# Patient Record
Sex: MALE | NOT HISPANIC OR LATINO | Employment: OTHER | URBAN - METROPOLITAN AREA
[De-identification: names, ages, dates, MRNs, and addresses within clinical notes are randomized per-mention and may not be internally consistent; named-entity substitution may affect disease eponyms.]

---

## 2024-02-29 ENCOUNTER — TRANSFERRED RECORDS (OUTPATIENT)
Dept: HEALTH INFORMATION MANAGEMENT | Facility: CLINIC | Age: 84
End: 2024-02-29
Payer: MEDICARE

## 2024-02-29 LAB — RETINOPATHY: POSITIVE

## 2024-03-04 ENCOUNTER — TRANSCRIBE ORDERS (OUTPATIENT)
Dept: OTHER | Age: 84
End: 2024-03-04

## 2024-03-04 ENCOUNTER — PATIENT OUTREACH (OUTPATIENT)
Dept: ONCOLOGY | Facility: CLINIC | Age: 84
End: 2024-03-04
Payer: MEDICARE

## 2024-03-04 DIAGNOSIS — C69.32 CHOROIDAL MALIGNANT MELANOMA, LEFT (H): Primary | ICD-10-CM

## 2024-03-04 NOTE — PROGRESS NOTES
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    Payor: MEDICARE / Plan: MEDICARE RAILROAD PART B ONLY / Product Type: Medicare /     March 4, 2024    Called patient to introduced myself and role as nurse navigator with [facility] Hematology/Oncology department and to inform them that we have received the referral for a diagnosis of choroidal melanoma from Dr. Fonseca.    Patient did not answer the phone so a detailed message was left for them including NPS number. Requested callback to speak to a  to set the consult date/time/location. Explained to patient in the message that they will receive a call from our new patient scheduling team (NPS number below, hours are Monday - Friday 8am - 4:30 pm) in the next 1-2 business days to schedule the consultation. Encouraged them to call back with any questions.     March 14, 2024  Message from Dr. Pena requesting that patient be scheduled for:  PET/CT, MRI abdomen, and labs (CBC & CMP) that he would like done prior to patient's consult on 3/26/24 with him. Called patient to discuss. Patient did not answer phone. Detailed message left for patient requesting call back.   Message sent to NPS team to schedule.     Ning France, RN, BSN  [facility] Hematology/Oncology Nurse Navigator  [phone]

## 2024-03-13 DIAGNOSIS — C69.32 CHOROIDAL MALIGNANT MELANOMA, LEFT (H): Primary | ICD-10-CM

## 2024-03-13 NOTE — TELEPHONE ENCOUNTER
RECORDS STATUS - ALL OTHER DIAGNOSIS      RECORDS RECEIVED FROM: [facility] [place], [place]   DATE RECEIVED:    NOTES STATUS DETAILS   OFFICE NOTE from referring provider Epic/[facility] Jenna Fonseca MD: 2/29/24   OFFICE NOTE from Ophthalmologist [facility] - [place] Dr. Ximena Corrigan: 9/14/23   MEDICATION LIST [facility]   LABS     ANYTHING RELATED TO DIAGNOSIS Epic/[facility] 3/1/24   IMAGING (NEED IMAGES & REPORT)     CT SCANS Requested 3/13 9/11/23, 3/20/23: [place]   MRI Requested 3/13 9/11/23, 3/31/23: [place]

## 2024-03-14 DIAGNOSIS — C69.32 CHOROIDAL MALIGNANT MELANOMA, LEFT (H): Primary | ICD-10-CM

## 2024-03-26 ENCOUNTER — PRE VISIT (OUTPATIENT)
Dept: ONCOLOGY | Facility: CLINIC | Age: 84
End: 2024-03-26
Payer: MEDICARE

## 2024-04-04 ENCOUNTER — HOSPITAL ENCOUNTER (OUTPATIENT)
Dept: MRI IMAGING | Facility: CLINIC | Age: 84
Discharge: HOME | End: 2024-04-04
Attending: STUDENT IN AN ORGANIZED HEALTH CARE EDUCATION/TRAINING PROGRAM
Payer: MEDICARE

## 2024-04-04 ENCOUNTER — HOSPITAL ENCOUNTER (OUTPATIENT)
Dept: PET IMAGING | Facility: CLINIC | Age: 84
Discharge: HOME | End: 2024-04-04
Attending: STUDENT IN AN ORGANIZED HEALTH CARE EDUCATION/TRAINING PROGRAM
Payer: MEDICARE

## 2024-04-04 VITALS — BODY MASS INDEX: 28.88 KG/M2 | HEIGHT: 69 IN | WEIGHT: 195 LBS

## 2024-04-04 DIAGNOSIS — C69.32 CHOROIDAL MALIGNANT MELANOMA, LEFT (H): ICD-10-CM

## 2024-04-04 LAB
CREAT BLD-MCNC: 1 MG/DL (ref 0.7–1.3)
EGFRCR SERPLBLD CKD-EPI 2021: >60 ML/MIN/1.73M2
RADIOLOGIST FLAGS: NORMAL
RADIOLOGIST FLAGS: NORMAL

## 2024-04-04 PROCEDURE — 71260 CT THORAX DX C+: CPT

## 2024-04-04 PROCEDURE — A9552 F18 FDG: HCPCS | Performed by: STUDENT IN AN ORGANIZED HEALTH CARE EDUCATION/TRAINING PROGRAM

## 2024-04-04 PROCEDURE — G1010 CDSM STANSON: HCPCS | Performed by: RADIOLOGY

## 2024-04-04 PROCEDURE — 78816 PET IMAGE W/CT FULL BODY: CPT | Mod: MG,PI

## 2024-04-04 PROCEDURE — 71260 CT THORAX DX C+: CPT | Mod: 26 | Performed by: RADIOLOGY

## 2024-04-04 PROCEDURE — 74183 MRI ABD W/O CNTR FLWD CNTR: CPT | Mod: 26 | Performed by: STUDENT IN AN ORGANIZED HEALTH CARE EDUCATION/TRAINING PROGRAM

## 2024-04-04 PROCEDURE — 74177 CT ABD & PELVIS W/CONTRAST: CPT | Mod: 26 | Performed by: RADIOLOGY

## 2024-04-04 PROCEDURE — G1010 CDSM STANSON: HCPCS | Performed by: STUDENT IN AN ORGANIZED HEALTH CARE EDUCATION/TRAINING PROGRAM

## 2024-04-04 PROCEDURE — 255N000002 HC RX 255 OP 636: Performed by: STUDENT IN AN ORGANIZED HEALTH CARE EDUCATION/TRAINING PROGRAM

## 2024-04-04 PROCEDURE — 343N000001 HC RX 343: Performed by: STUDENT IN AN ORGANIZED HEALTH CARE EDUCATION/TRAINING PROGRAM

## 2024-04-04 PROCEDURE — 82565 ASSAY OF CREATININE: CPT | Mod: 91

## 2024-04-04 PROCEDURE — 74183 MRI ABD W/O CNTR FLWD CNTR: CPT | Mod: MG

## 2024-04-04 PROCEDURE — A9581 GADOXETATE DISODIUM INJ: HCPCS | Performed by: STUDENT IN AN ORGANIZED HEALTH CARE EDUCATION/TRAINING PROGRAM

## 2024-04-04 PROCEDURE — 250N000011 HC RX IP 250 OP 636: Performed by: STUDENT IN AN ORGANIZED HEALTH CARE EDUCATION/TRAINING PROGRAM

## 2024-04-04 PROCEDURE — 78813 PET IMAGE FULL BODY: CPT | Mod: 26 | Performed by: RADIOLOGY

## 2024-04-04 RX ORDER — IOPAMIDOL 755 MG/ML
10-135 INJECTION, SOLUTION INTRAVASCULAR ONCE
Status: COMPLETED | OUTPATIENT
Start: 2024-04-04 | End: 2024-04-04

## 2024-04-04 RX ADMIN — GADOXETATE DISODIUM 9 ML: 181.43 INJECTION, SOLUTION INTRAVENOUS at 16:05

## 2024-04-04 RX ADMIN — IOPAMIDOL 112 ML: 755 INJECTION, SOLUTION INTRAVENOUS at 11:50

## 2024-04-04 RX ADMIN — FLUDEOXYGLUCOSE F-18 11.73 MILLICURIE: 500 INJECTION, SOLUTION INTRAVENOUS at 10:51

## 2024-04-04 NOTE — PROGRESS NOTES
[facility] [place]   New Patient Visit    Name: Benson Moeller  MRN: [id number]  Date of visit: Apr 5, 2024    Diagnosis: Choroidal melanoma of the left eye  Stage:    Cancer Staging   Choroidal malignant melanoma, left (H)  Staging form: Uvea - Ciliary Body and Choroid, AJCC 8th Edition  - Clinical: Stage IIB (cT3a, cN0, cM0) - Signed by Forest Pena MD on 4/5/2024      Molecular: TBD  Performance status: ECOG 0-1    Oncology History:   -9/2023, bilateral choroidal lesions, both considered high risk for malignant melanoma, overall stabel since 5/2023 (Dr. Corrigan, [place]). Elected for observation.  -9/11/23 CT chest: Stable 4 mm nodule in the left lower lobe. No evidence of new or progressive metastasis in the chest.   -9/12/23 MR liver: 1.  6 mm osseous lesion in L2 and enhancing lesion in T8 correlate with lytic lesions seen on recent CT suspicious for metastasis. 2.  Stable tiny enhancing left adrenal nodules.   -2/29/24 seen by Dr. Fonseca, lesion overall stable on exam, imaging shows left eye 2.97 (h) x 16.82 mm (l) x 12.15 mm (w); right eye: 2.02 mm (h) x 7.43 mm (l) x 6.87 mm (w)  -4/4/24 PET/CT: No evidence of hypermetabolic distant metastasis. Few scattered sub-4 mm solid pulmonary nodules  -4/4/24 MR liver: No evidence of metastatic disease.       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     Left eye lesion was first noted during a routine eye exam about a year ago, noted to growing over time. R eye lesion, although smaller, is involving the macula. Had been followed with Dr. Corrigan at [place], now following with Dr. Fonseca ([facility]) and referred to medical oncology for surveillance planning.     Overall feels well, no acute complaints today. Of note, his MRI of the liver in September showed lytic lesions at T8, however,  "these were ultimately deemed to be likely benign, and subsequent imaging has not demonstrated concern for metastatic disease.     His oncology history is outlined above.     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 Key findings include normal renal and hepatic function. Normal cell counts.     Imaging:   All pertinent imaging studies personally reviewed, boyd findings included in oncology history above      Pathology:   N/a      Assessment and Plan:   Benson Moeller is a 83 year old male with a left sided uveal melanoma and right sided suspicious choroidal nevus, presenting today to establish with medical oncology.       # L uveal melanoma  # R suspicious choroidal nevus  -previously followed with Dr. Corrigan at [place], now established with Dr. Fonseca  -L sided lesion is most concerning for malignant choroidal melanoma  -imaging shows no evidence of metastatic disease  -today we discussed the diagnosis, stage, and natural history of this disease, including the risk of distant spread outside of the eye and the role of active surveillance to monitor for metastatic disease  -by size criteria, his primary lesion would be staged as T3a, which portends intermediate risk of recurrence (stage IIB by AJCC 8)    Plan:  --continue follow up with Dr. Fonseca to finalize a plan for local management of the L sided melanoma  --if planning for biopsy or enucleation, will send tissue for molecular " characterization for further classification of his recurrence risk  --plan for repeat MR liver and CT chest in 3 months      Forest Pena MD PhD   of Medicine  Division of Hematology, Oncology and Transplantation    ---  78 minutes were spent on the date of the encounter performing chart review, history and exam, documentation, and further activities as noted above.     The longitudinal plan of care for the diagnosis(es)/condition(s) as documented were addressed during this visit. Due to the added complexity in care, I will continue to support Riley in the subsequent management and with ongoing continuity of care.

## 2024-04-05 ENCOUNTER — ONCOLOGY VISIT (OUTPATIENT)
Dept: ONCOLOGY | Facility: CLINIC | Age: 84
End: 2024-04-05
Attending: STUDENT IN AN ORGANIZED HEALTH CARE EDUCATION/TRAINING PROGRAM
Payer: MEDICARE

## 2024-04-05 ENCOUNTER — NURSE TRIAGE (OUTPATIENT)
Dept: ONCOLOGY | Facility: CLINIC | Age: 84
End: 2024-04-05
Payer: MEDICARE

## 2024-04-05 VITALS
HEIGHT: 67 IN | DIASTOLIC BLOOD PRESSURE: 78 MMHG | WEIGHT: 195.9 LBS | OXYGEN SATURATION: 98 % | BODY MASS INDEX: 30.75 KG/M2 | HEART RATE: 62 BPM | RESPIRATION RATE: 18 BRPM | SYSTOLIC BLOOD PRESSURE: 164 MMHG | TEMPERATURE: 96.7 F

## 2024-04-05 DIAGNOSIS — C69.32 CHOROIDAL MALIGNANT MELANOMA, LEFT (H): ICD-10-CM

## 2024-04-05 PROCEDURE — 99417 PROLNG OP E/M EACH 15 MIN: CPT | Performed by: STUDENT IN AN ORGANIZED HEALTH CARE EDUCATION/TRAINING PROGRAM

## 2024-04-05 PROCEDURE — G0463 HOSPITAL OUTPT CLINIC VISIT: HCPCS | Performed by: STUDENT IN AN ORGANIZED HEALTH CARE EDUCATION/TRAINING PROGRAM

## 2024-04-05 PROCEDURE — 99215 OFFICE O/P EST HI 40 MIN: CPT | Performed by: STUDENT IN AN ORGANIZED HEALTH CARE EDUCATION/TRAINING PROGRAM

## 2024-04-05 PROCEDURE — G2211 COMPLEX E/M VISIT ADD ON: HCPCS | Performed by: STUDENT IN AN ORGANIZED HEALTH CARE EDUCATION/TRAINING PROGRAM

## 2024-04-05 RX ORDER — ASPIRIN 81 MG/1
1 TABLET ORAL EVERY MORNING
COMMUNITY
Start: 2023-02-20

## 2024-04-05 RX ORDER — ATORVASTATIN CALCIUM 20 MG/1
10 TABLET, FILM COATED ORAL EVERY EVENING
COMMUNITY
Start: 2024-02-21

## 2024-04-05 RX ORDER — LISINOPRIL 2.5 MG/1
2.5 TABLET ORAL EVERY EVENING
COMMUNITY
Start: 2024-02-21

## 2024-04-05 RX ORDER — LANOLIN ALCOHOL/MO/W.PET/CERES
1000 CREAM (GRAM) TOPICAL EVERY MORNING
COMMUNITY
Start: 2023-03-07

## 2024-04-05 ASSESSMENT — PAIN SCALES - GENERAL: PAINLEVEL: NO PAIN (0)

## 2024-04-05 NOTE — LETTER
4/5/2024         RE: Benson Moeller  [address] [facility] [address]        Dear Colleague,    Thank you for referring your patient, Benson Moeller, to the [facility]. Please see a copy of my visit note below.    [facility]   New Patient Visit    Name: Benson Moeller  MRN: [id number]  Date of visit: Apr 5, 2024    Diagnosis: Choroidal melanoma of the left eye  Stage:    Cancer Staging   Choroidal malignant melanoma, left (H)  Staging form: Uvea - Ciliary Body and Choroid, AJCC 8th Edition  - Clinical: Stage IIB (cT3a, cN0, cM0) - Signed by Forest Pena MD on 4/5/2024      Molecular: TBD  Performance status: ECOG 0-1    Oncology History:   -9/2023, bilateral choroidal lesions, both considered high risk for malignant melanoma, overall stabel since 5/2023 (Dr. Corrigan, [place]). Elected for observation.  -9/11/23 CT chest: Stable 4 mm nodule in the left lower lobe. No evidence of new or progressive metastasis in the chest.   -9/12/23 MR liver: 1.  6 mm osseous lesion in L2 and enhancing lesion in T8 correlate with lytic lesions seen on recent CT suspicious for metastasis. 2.  Stable tiny enhancing left adrenal nodules.   -2/29/24 seen by Dr. Fonseca, lesion overall stable on exam, imaging shows left eye 2.97 (h) x 16.82 mm (l) x 12.15 mm (w); right eye: 2.02 mm (h) x 7.43 mm (l) x 6.87 mm (w)  -4/4/24 PET/CT: No evidence of hypermetabolic distant metastasis. Few scattered sub-4 mm solid pulmonary nodules  -4/4/24 MR liver: No evidence of metastatic disease.       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     Left eye lesion was first noted during a routine eye exam about a year ago, noted to growing over time. R eye lesion, although smaller, is involving the macula. Had been followed with Dr. Corrigan at  "[place], now following with Dr. Fonseca ([facility]) and referred to medical oncology for surveillance planning.     Overall feels well, no acute complaints today. Of note, his MRI of the liver in September showed lytic lesions at T8, however, these were ultimately deemed to be likely benign, and subsequent imaging has not demonstrated concern for metastatic disease.     His oncology history is outlined above.     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 Key findings include normal renal and hepatic function. Normal cell counts.     Imaging:   All pertinent imaging studies personally reviewed, boyd findings included in oncology history above      Pathology:   N/a      Assessment and Plan:   Benson Moeller is a 83 year old male with a left sided uveal melanoma and right sided suspicious choroidal nevus, presenting today to establish with medical oncology.       # L uveal melanoma  # R suspicious choroidal nevus  -previously followed with Dr. Corrigan at [place], now established with Dr. Fonseca  -L sided lesion is most concerning for malignant choroidal melanoma  -imaging shows no evidence of metastatic disease  -today we discussed the diagnosis, stage, and natural history of this disease, including the risk of distant spread outside of the eye and the role of active surveillance to monitor for metastatic disease  -by size criteria, his primary lesion would be staged as T3a, which " portends intermediate risk of recurrence (stage IIB by AJCC 8)    Plan:  --continue follow up with Dr. Fonseca to finalize a plan for local management of the L sided melanoma  --if planning for biopsy or enucleation, will send tissue for molecular characterization for further classification of his recurrence risk  --plan for repeat MR liver and CT chest in 3 months      Forest Pena MD PhD   of Medicine  Division of Hematology, Oncology and Transplantation    ---  78 minutes were spent on the date of the encounter performing chart review, history and exam, documentation, and further activities as noted above.     The longitudinal plan of care for the diagnosis(es)/condition(s) as documented were addressed during this visit. Due to the added complexity in care, I will continue to support Benson in the subsequent management and with ongoing continuity of care.          Reached patient by phone to discuss his incidentally found diverticulitis on imaging. He reports that Monday he had abdominal pain that persisted for two or three days. This was associated with urgency of bowel movements. He denied any fevers or chills or other constitutional symptoms. He is now improving. He has no pain. No diarrhea. No bloody stools.    We discuss the options for empiric antibiotics versus conservative management  with observation alone. As he is improving, and had only mild symptoms, we will hold off on anabiotic s for now. We discussed that any recurrence of fever or abdominal symptoms. He would either present to an emergency room over the weekend or call either myself or his primary care physician next week to discuss anabiotic therapy. Would consider adding Augmentin for 10 days    All questions and concerns were addressed.    Tamanna Pena MD PhD   of Medicine   Division of Hematology, Oncology, and Transplantation

## 2024-04-05 NOTE — NURSING NOTE
"Oncology Rooming Note    April 5, 2024 2:53 PM   Benson Moeller is a 83 year old male who presents for:    Chief Complaint   Patient presents with    Oncology Clinic Visit     Choroidal malignant melanoma     Initial Vitals: BP (!) 164/78   Pulse 62   Temp (!) 96.7  F (35.9  C) (Oral)   Resp 18   Ht 1.703 m (5' 7.03\")   Wt 88.9 kg (195 lb 14.4 oz)   SpO2 98%   BMI 30.66 kg/m   Estimated body mass index is 30.66 kg/m  as calculated from the following:    Height as of this encounter: 1.703 m (5' 7.03\").    Weight as of this encounter: 88.9 kg (195 lb 14.4 oz). Body surface area is 2.05 meters squared.  No Pain (0) Comment: Data Unavailable   No LMP for male patient.  Allergies reviewed: Yes  Medications reviewed: Yes    Medications: Medication refills not needed today.  Pharmacy name entered into [employer]: [facility] [address]    Frailty Screening:   Is the patient here for a new oncology consult visit in cancer care? 1. Yes. Over the past month, have you experienced difficulty or required a caregiver to assist with:   1. Balance, walking or general mobility (including any falls)? NO  2. Completion of self-care tasks such as bathing, dressing, toileting, grooming/hygiene?  NO  3. Concentration or memory that affects your daily life?  NO       Clinical concerns: New pt evaluation.       Pura Harris CMA              "

## 2024-04-05 NOTE — TELEPHONE ENCOUNTER
[facility] calling to report incidental finding on CT 04/04/24:    Impression-  3. Uncomplicated sigmoid diverticulitis.      Secure message to     [id number]  acknowledging incidental finding. Nothing further needed from triage.

## 2024-04-06 NOTE — PROGRESS NOTES
Reached patient by phone to discuss his incidentally found diverticulitis on imaging. He reports that Monday he had abdominal pain that persisted for two or three days. This was associated with urgency of bowel movements. He denied any fevers or chills or other constitutional symptoms. He is now improving. He has no pain. No diarrhea. No bloody stools.    We discuss the options for empiric antibiotics versus conservative management  with observation alone. As he is improving, and had only mild symptoms, we will hold off on anabiotic s for now. We discussed that any recurrence of fever or abdominal symptoms. He would either present to an emergency room over the weekend or call either myself or his primary care physician next week to discuss anabiotic therapy. Would consider adding Augmentin for 10 days    All questions and concerns were addressed.    Tamanna Pena MD PhD   of Medicine   Division of Hematology, Oncology, and Transplantation

## 2024-04-08 ENCOUNTER — PATIENT OUTREACH (OUTPATIENT)
Dept: ONCOLOGY | Facility: CLINIC | Age: 84
End: 2024-04-08
Payer: MEDICARE

## 2024-04-08 NOTE — PROGRESS NOTES
[facility]: Cancer Care Initial Note                                    Discussion with Patient:                                                      Reached out to Benson over the phone to introduce myself and role of RNCC.    Provided Benson with my contact information, as well as the phone number for triage and scheduling. Discussed plan for imaging, labs, and follow up with Dr. Pena in 3 months.    Encouraged Benson to reach out with any needs.     Assessment:                                                      Initial  Current living arrangement:: I live in a private home with spouse  Type of residence:: Private home - stairs  Informal Support system:: None  Equipment Currently Used at Home: none  Bed or wheelchair confined:: No  Mobility Status: Independent  Transportation means:: None  Medication adherence problem (GOAL):: No  Knowledgeable about how to use meds:: Yes  Medication side effects suspected:: No  Referrals Placed: None  Advanced Care Plans/Directives on file:: No  Advanced Care Plan/Directive Status: Declined Further Information  Pain Score: No Pain (0)    No assessment indicated    Intervention/Education provided during outreach:                                                       Plan:  Labs, imaging, and follow up with Dr. Pena in 3 months  Patient to follow up as scheduled at next appt  Patient to call/[employer] message with updates  Confirmed patient has clinic and triage numbers    Lynn Mike, RN, BSN  RN Care Coordinator  [facility]

## 2024-04-11 ENCOUNTER — TRANSFERRED RECORDS (OUTPATIENT)
Dept: HEALTH INFORMATION MANAGEMENT | Facility: CLINIC | Age: 84
End: 2024-04-11
Payer: MEDICARE

## 2024-04-11 LAB — RETINOPATHY: POSITIVE

## 2024-04-12 DIAGNOSIS — C69.32 CHOROID MELANOMA OF LEFT EYE (H): Primary | ICD-10-CM

## 2024-04-12 NOTE — TELEPHONE ENCOUNTER
FUTURE VISIT INFORMATION      SURGERY INFORMATION:  Date: tbd- oncology  Consult: ov 4/5    RECORDS REQUESTED FROM:       Primary Care Provider: Wan Vaughan M.D. - [facility]      
bulb syringe

## 2024-04-15 ENCOUNTER — PRE VISIT (OUTPATIENT)
Dept: RADIATION ONCOLOGY | Facility: CLINIC | Age: 84
End: 2024-04-15
Payer: MEDICARE

## 2024-04-15 NOTE — TELEPHONE ENCOUNTER
RECORDS STATUS - ALL OTHER DIAGNOSIS      RECORDS RECEIVED FROM: [facility] - Internal records   DATE RECEIVED: 4/15

## 2024-04-15 NOTE — NURSING NOTE
Date: 4/15/2024   Age: 83 year old  Ethnicity:    Sex: male  : 1940   Lives In: [place], [place]      Diagnosis: Left Choroidal Melanoma    Prior radiation therapy:   Site Treated: at  Facility: at  Dates: at  Dose: at    Site Treated: at  Facility: at  Dates: at  Dose: at    Prior chemotherapy:   Protocol: at  Facility: at  Dates: at    RN time with patient:  Educated on Gamma Knife:    Doctors: Dr. Jenna Fonseca, Dr. Forest Pena, Dr. Maverick Mcnamara    Pain at time of consult:  Does pt have a living will:  Does pt have implanted cardiac device:    Has pt fallen in past week:  Does pt feel steady on his feet:     Review Since Diagnosis:    3/9/23; left eye lesion noted on a routine exam, measurements of lesion 0.3 h x 1.3 l x 1.0 w.  Suspicious right eye nevus  23; measurements of left choroidal lesion  0.3 h x 1.5 l x 1.1 w  23; pt saw Dr. Corrigan, ophthalmologist at [facility], pt has bilateral suspicious choroidal nevi.  Notes lesions stable since 2023, but highly suspicious for choroidal melanoma although not really growing and would be unusual to have bilateral.  In light of left lesion being larger than right presented treatment options.   Pt opted to observe   23; measurements of left choroidal lesion 0.4 h x 1.7 l x 1.0 w  24; met with Dr. Fonseca, lesion left eye measured 0.3 h x 1.7 l x 1.2 w.  To see Dr. Pena, felt nevus right eye stable  24; MRI Liver, multiple simple hepatic cysts, nodular enhancement consistent with a benign hepatic hemangioma, no evidence of mets   24; PET/CT, no evidence of distant mets, few scattered sub 4 mm lung nodules, below size for PET characterization, uncomplicated sigmoid diverticulitis  24; pt saw Dr. Pena at the request of Dr. Fonseca, no mets, follow up every 3 months  24; Dr. Pena called pt regarding incidental finding of diverticulitis on scan, pt not real bothered by so no antibiotics   24;  pt saw Dr. Fonseca in follow up, has nevus right eye, but choroidal melanoma left eye.  Pt having blurred vision and floaters both eyes.  Stable pigmented choroidal lesion right eye.  Pigmented elevated choroidal lesion left, increased pigmentation at borders since 3/9/23.  Dr. Fonseca notes that based on size of lesion, 0.3 h x 1.8 l x 1.2 w, suspicious features of SRF and thickness feels most likely is a slow growing choroidal melanoma.  Felt to be 2 mm from optic nerve.  Discussed treatment options in light of scans showing no mets.  Prefer to not have enucleation as has nevus on right eye that is being watched.  Discussed Gamma Knife as treatment of choice and pt agrees.  Emphasized need for close follow up with Dr. Pena and to monitor nevus right eye.  Refer to Dr. Landin     Chief Complaint: at consult

## 2024-04-19 NOTE — PROGRESS NOTES
Radiation Oncology Consultation:  Date on this visit: 4/22/2024    Benson Moeller  is referred by Dr Fonseca  provider found for a radiation oncology consultation. He requires evaluation for diagnosis of Choroidal melanom of the LEFT eye       History Of Present Illness:  Mr. Moeller is a 83 year old male who presents with a diagnosis of  Choroidal melanoma of the LEFT eye .       In May  of 2023 he was found to have bilateral choroidal lesions, of uncertain biology.   MRi of the liver revealed no suspicion for mets in the liver.   The Chest CT revealed only a 4 mm lll nodule        Re-evaluation in 9/23  showed the eye lesions to be stable.  The left lesion was more concerning for melanoma.     This was through Dr Corrigan at [facility].     He elected observation    He was seen on 2/29/24 by Dr Fonseca who noted that the lesions were stable  left eye 2.97 (h) x 16.82 mm (l) x 12.15 mm (w); right eye: 2.02 mm (h) x 7.43 mm (l) x 6.87 mm (w).  The lesion on the left was deemed to be a slowly growing choroidal melanoma.  is visual accuity in the left is poor.      Due to the location of the tumor, Dr Fonseca believes GK radiosurgery would be preferred .   The lesion is posteior and nasal    Repeat MRI of the liver on 4/4 24 revealed only a 3.3 cm of what was thought to be a hemangioma.   PET scan and  Ct of the CAP on 4/4 24 revealed  no evidence for metastasis  ( a few scattered sub 4 mm nodues below resolution of PET)       Past Medical/Surgical History:  Past Medical History:   Diagnosis Date    Arthritis     Choroidal malignant melanoma, left (H)     Diabetes mellitus, type 2 (H)     HTN (hypertension)     Hypercholesteremia      No past surgical history on file.    Past Radiation History: none  Past Chemotherapy History:  Implanted Cardiac device:   none  Advanced directive  :      Review of Systems:  Reviewed.  See details in nursing note    Allergies:  Allergies as of 04/22/2024    (No Known Allergies)       Current  Medications:     Current Outpatient Medications:     aspirin 81 MG EC tablet, Take 1 tablet by mouth daily, Disp: , Rfl:     atorvastatin (LIPITOR) 20 MG tablet, Take 10 mg by mouth, Disp: , Rfl:     cyanocobalamin (VITAMIN B-12) 1000 MCG tablet, Take 1,000 mcg by mouth daily, Disp: , Rfl:     lisinopril (ZESTRIL) 2.5 MG tablet, Take 2.5 mg by mouth, Disp: , Rfl:     metFORMIN (GLUCOPHAGE) 1000 MG tablet, Take 1,000 mg by mouth, Disp: , Rfl:     Family History:  Family History   Problem Relation Age of Onset    Cancer Sister     Diabetes Sister     Diabetes Brother     Lymphoma Brother        Social History:  Social History     Socioeconomic History    Marital status:      Spouse name: Not on file    Number of children: Not on file    Years of education: Not on file    Highest education level: Not on file   Occupational History    Not on file   Tobacco Use    Smoking status: Former     Current packs/day: 0.00     Average packs/day: 1 pack/day for 17.0 years (17.0 ttl pk-yrs)     Types: Cigarettes     Start date: 1971     Quit date: 1988     Years since quittin.3     Passive exposure: Past    Smokeless tobacco: Never    Tobacco comments:     Smoke for 17 years   Substance and Sexual Activity    Alcohol use: Not Currently     Comment: on occassion    Drug use: Not Currently    Sexual activity: Not on file   Other Topics Concern    Not on file   Social History Narrative    Not on file     Social Determinants of Health     Financial Resource Strain: Not on file   Food Insecurity: Not on file   Transportation Needs: No Transportation Needs (2023)    Received from [facility]    PRAPARE - Transportation     Lack of Transportation (Medical): No     Lack of Transportation (Non-Medical): No   Physical Activity: Unknown (2023)    Received from [facility]    Exercise Vital Sign     Days of Exercise per Week: 5 days     Minutes of Exercise per Session: Not on file  "  Stress: No Stress Concern Present (2/20/2023)    [facility]    [employer] - Occupational Stress Questionnaire     Feeling of Stress : Not at all   Social Connections: Unknown (2/20/2023)    [facility]    Social Connection and Isolation Panel [NHANES]     Frequency of Communication with Friends and Family: Not on file     Frequency of Social Gatherings with Friends and Family: Not on file     Attends Hinduism Services: Not on file     Active Member of Clubs or Organizations: No     Attends Club or Organization Meetings: Never     Marital Status:    Interpersonal Safety: Not At Risk (2/20/2023)    Received from [facility]    Humiliation, Afraid, Rape, and Kick questionnaire     Fear of Current or Ex-Partner: No     Emotionally Abused: No     Physically Abused: No     Sexually Abused: No   Housing Stability: Unknown (2/20/2023)    [facility]    Housing Stability Vital Sign     Unable to Pay for Housing in the Last Year: Not on file     Number of Places Lived in the Last Year: Not on file     In the last 12 months, was there a time when you did not have a steady place to sleep or slept in a shelter (including now)?: No       Physical Exam:  There were no vitals taken for this visit.  BP (!) 176/84   Pulse 61   Resp 18   Ht 1.753 m (5' 9\")   Wt 88.5 kg (195 lb)   SpO2 96%   BMI 28.80 kg/m     GENERAL: Healthy, alert and no distress  EYES: Eyes grossly normal to inspection.  No discharge or erythema, or obvious scleral/conjunctival abnormalities.  RESP: No audible wheeze, cough, or visible cyanosis.  No visible retractions or increased work of breathing.    SKIN: Visible skin clear. No significant rash, abnormal pigmentation or lesions.  NEURO: Cranial nerves grossly intact.  Mentation and speech appropriate for age.  PSYCH: Mentation appears normal, affect normal/bright, " judgement and insight intact, normal speech and appearance well-groomed.       Pathology:  none    Laboratory/Imaging Studies  reviewed  MRIs, PETs and CT     ASSESSMENT:    left sided nasal uveal melanoma and right sided suspicious choroidal nevus,    MRI of the liver in September showed lytic lesions at T8, however, these were ultimately deemed to be likely benign, and subsequent imaging has not demonstrated concern for metastatic disease    RECOMMENDATION:     He is an excellent candidate for G[facility]nife Radiosurgery to the CMM in the left eye.  It is slowly growing but in a location that makes a COMS plaque challenging .       The risks and benefits of radiotherapy were discussed with the patient.  Potential acute and long term side effects were explained.   His vision is the left is very poor.    The patient agrees to proceed with Gammaknife radiation therapy.  A written consent was obtained.    Thank you for allowing me to participate in Benson Moeller 's care .  Please do not hesitate to call me with questions.    Bertha Landin MD  [phone]   Pager   [phone]  [facility]   [phone] [place]  [phone] [facility]  Primary Physician: Wan Vaughan   Patient Care Team:  Wan Vaughan MD as PCP - General  Jenna Fonseca MD as Physician (Ophthalmology)  Forest Pena MD as MD (Hematology & Oncology)  Lynn Mike, VENUS as Specialty Care Coordinator (Hematology & Oncology)  Emily Millard PA-C as Physician Assistant (Anesthesiology)

## 2024-04-22 ENCOUNTER — OFFICE VISIT (OUTPATIENT)
Dept: SURGERY | Facility: CLINIC | Age: 84
End: 2024-04-22
Payer: MEDICARE

## 2024-04-22 ENCOUNTER — OFFICE VISIT (OUTPATIENT)
Dept: RADIATION ONCOLOGY | Facility: CLINIC | Age: 84
End: 2024-04-22
Attending: RADIOLOGY
Payer: MEDICARE

## 2024-04-22 ENCOUNTER — PRE VISIT (OUTPATIENT)
Dept: RADIATION ONCOLOGY | Facility: CLINIC | Age: 84
End: 2024-04-22

## 2024-04-22 ENCOUNTER — ANESTHESIA EVENT (OUTPATIENT)
Dept: SURGERY | Facility: CLINIC | Age: 84
End: 2024-04-22
Payer: MEDICARE

## 2024-04-22 ENCOUNTER — PRE VISIT (OUTPATIENT)
Dept: SURGERY | Facility: CLINIC | Age: 84
End: 2024-04-22

## 2024-04-22 VITALS
DIASTOLIC BLOOD PRESSURE: 84 MMHG | HEIGHT: 69 IN | OXYGEN SATURATION: 96 % | SYSTOLIC BLOOD PRESSURE: 176 MMHG | HEART RATE: 61 BPM | BODY MASS INDEX: 28.88 KG/M2 | RESPIRATION RATE: 18 BRPM | WEIGHT: 195 LBS

## 2024-04-22 VITALS
OXYGEN SATURATION: 97 % | SYSTOLIC BLOOD PRESSURE: 182 MMHG | HEIGHT: 67 IN | HEART RATE: 59 BPM | RESPIRATION RATE: 16 BRPM | TEMPERATURE: 98 F | WEIGHT: 199.7 LBS | DIASTOLIC BLOOD PRESSURE: 82 MMHG | BODY MASS INDEX: 31.34 KG/M2

## 2024-04-22 DIAGNOSIS — Z01.818 PRE-OP EVALUATION: Primary | ICD-10-CM

## 2024-04-22 DIAGNOSIS — C69.32 CHOROID MELANOMA OF LEFT EYE (H): Primary | ICD-10-CM

## 2024-04-22 DIAGNOSIS — C69.32 MALIGNANT MELANOMA OF CHOROID OF LEFT EYE (H): ICD-10-CM

## 2024-04-22 PROCEDURE — 77470 SPECIAL RADIATION TREATMENT: CPT | Performed by: RADIOLOGY

## 2024-04-22 PROCEDURE — 99203 OFFICE O/P NEW LOW 30 MIN: CPT | Performed by: PHYSICIAN ASSISTANT

## 2024-04-22 PROCEDURE — 99204 OFFICE O/P NEW MOD 45 MIN: CPT | Mod: 25 | Performed by: RADIOLOGY

## 2024-04-22 PROCEDURE — G0463 HOSPITAL OUTPT CLINIC VISIT: HCPCS | Performed by: RADIOLOGY

## 2024-04-22 PROCEDURE — 77470 SPECIAL RADIATION TREATMENT: CPT | Mod: 26 | Performed by: RADIOLOGY

## 2024-04-22 ASSESSMENT — ENCOUNTER SYMPTOMS
MUSCULOSKELETAL NEGATIVE: 1
VOMITING: 0
CARDIOVASCULAR NEGATIVE: 1
BLURRED VISION: 1
RESPIRATORY NEGATIVE: 1
BLOOD IN STOOL: 0
FEVER: 0
NAUSEA: 0
DEPRESSION: 0
CONSTIPATION: 0
DIARRHEA: 0
HEADACHES: 0
WEIGHT LOSS: 0
SEIZURES: 0

## 2024-04-22 ASSESSMENT — PAIN SCALES - GENERAL: PAINLEVEL: NO PAIN (0)

## 2024-04-22 ASSESSMENT — LIFESTYLE VARIABLES: TOBACCO_USE: 1

## 2024-04-22 NOTE — PATIENT INSTRUCTIONS
Preparing for Your Surgery      Name:  Benson Moeller   MRN:  [id number]   :  1940   Today's Date:  2024       Arriving for surgery:  Surgery date:  24  Arrival time:  5:30 am  Surgery time: 7:30 am    Please come to:     Please come to:       [facility]    [address] [place]  [address]     The [facility] Patient/Visitor Ramp is at [address]. Patients and visitors who self-park will receive the reduced hospital parking rate. If the Patient /Visitor Ramp is full, please follow the signs to the [employer] car park located at the main hospital entrance.       parking is available (24 hours/ 7 days a week)      Discounted parking pass options are available for patients and visitors. They can be purchased at the [employer] desk at the main hospital entrance.     -    Stop at the security desk and they will direct surgery patients to the Surgery Check in and Family [facility]. [phone]        - If you need directions, a wheelchair or an escort please stop at the Information/security desk in the lobby.     What can I eat or drink?  -  You may eat and drink normally up to 8 hours prior to arrival time. (Until 9:30 pm on 24)  -  You may have clear liquids until 2 hours prior to arrival time. (Until 3:30 am on 24)    Examples of clear liquids:  Water  Clear broth  Juices (apple, white grape, white cranberry  and cider) without pulp  Noncarbonated, powder based beverages  (lemonade and Francisco-Aid)  Sodas (Sprite, 7-Up, ginger ale and seltzer)  Coffee or tea (without milk or cream)  Gatorade    -  No Alcohol or cannabis products for at least 24 hours before surgery.     Which medicines can I take?    Hold Aspirin for 7 days before surgery.     Hold Ibuprofen (Advil, Motrin) for 1 day(s) before surgery--unless otherwise directed by surgeon.  Hold Naproxen (Aleve)  for 4 days before surgery.    -  DO NOT take these medications the day of surgery:   Metformin (Glucophage)   Vitamin B-12        How do I prepare myself?  - Please take 2 showers (one the night prior to surgery and one the morning of surgery) using Scrubcare or Hibiclens soap.    Use this soap only from the neck to your toes.     Leave the soap on your skin for one minute--then rinse thoroughly.      You may use your own shampoo and conditioner. No other hair products.   - Please remove all jewelry and body piercings.  - No lotions, deodorants or fragrance.  - Bring your ID and insurance card.    -If you use a CPAP machine, please bring the CPAP machine, tubing, and mask to hospital.    -If you have a Deep Brain Stimulator, Spinal Cord Stimulator, or any Neuro Stimulator device---you must bring the remote control to the hospital.      ALL PATIENTS GOING HOME THE SAME DAY OF SURGERY ARE REQUIRED TO HAVE A RESPONSIBLE ADULT TO DRIVE AND BE IN ATTENDANCE WITH THEM FOR 24 HOURS FOLLOWING SURGERY.    Covid testing policy as of 12/06/2022  Your surgeon will notify and schedule you for a COVID test if one is needed before surgery--please direct any questions or COVID symptoms to your surgeon      Questions or Concerns:    - For any questions regarding the day of surgery or your hospital stay, please contact the Pre Admission Nursing Office at [phone].       - If you have health changes between today and your surgery, please call your surgeon.       - For questions after surgery, please call your surgeons office.           Current Visitor Guidelines    You may have 2 visitors in the pre op area.    Visiting hours: 8 a.m. to 8:30 p.m.    Patients confirmed or suspected to have symptoms of COVID 19 or flu:     No visitors allowed for adult patients.   Children (under age 18) can have 1 named visitor.     People who are sick or showing symptoms of COVID 19 or flu:    Are not allowed to visit patients--we can only make  exceptions in special situations.       Please follow these guidelines for your visit:          Please maintain social distance          Masking is optional--however at times you may be asked to wear a mask for the safety of yourself and others     Clean your hands with alcohol hand . Do this when you arrive at and leave the building and patient room,    And again after you touch your mask or anything in the room.     Go directly to and from the room you are visiting.     Stay in the patient s room during your visit. Limit going to other places in the hospital as much as possible     Leave bags and jackets at home or in the car.     For everyone s health, please don t come and go during your visit. That includes for smoking   during your visit.

## 2024-04-22 NOTE — H&P
Pre-Operative H & P     CC:  Preoperative exam to assess for increased cardiopulmonary risk while undergoing surgery and anesthesia.    Date of Encounter: 4/22/2024  Primary Care Physician:  Wan Vaughan     Reason for visit:   Encounter Diagnoses   Name Primary?    Pre-op evaluation Yes    Malignant melanoma of choroid of left eye (H)        HPI  Benson Moeller is a 83 year old male who presents for pre-operative H & P in preparation for  Procedure Information       Case: [id number] Date/Time: 04/29/24 0730    Procedures:       LEFT EYE IMMOBILIZATION@0369-3946 IN MAIN OR (Left: Eye)      GAMMA KNIFE HEAD FRAME PLACEMENT@2207-1149 IN MAIN OR (Head)      Anesthesia IN THE MAIN OR MAGNETIC RESONANCE IMAGING@0048-7116,TREATMENT PLANNING@9955-4042 (Update)      ANESTHESIA OUT OF OR RADIATION ONCOLOGY GAMMA KNIFE TREATMENT/FRAME REMOVAL @7413-3916 AND 1130 PACU (Update)    Anesthesia type: General    Diagnosis: Melanoma, choroid (H) [C69.30]    Pre-op diagnosis: Melanoma, choroid (H) [C69.30]    Location: [facility]  /  OR    Providers: Jenna Fonseca MD; Antonio Ramsey MD; GENERIC ANESTHESIA PROVIDER            Patient is being evaluated for comorbid conditions of HTN, HLD, arthritis, type 2 diabetes, and diverticulosis.    He has a history of left sided uveal melanoma and right sided suspicious choroidal nevus. He has been evaluated by Dr. Fonseca ([facility]) and the above procedure has been recommended for further management.     History is obtained from the patient and chart review    Hx of abnormal bleeding or anti-platelet use: ASA 81 mg      Past Medical History  Past Medical History:   Diagnosis Date    Arthritis     Choroidal malignant melanoma, left (H)     Diabetes mellitus, type 2 (H)     HTN (hypertension)     Hypercholesteremia        Past Surgical History  Past Surgical History:   Procedure Laterality Date    COLONOSCOPY      x 4       Prior to Admission Medications  Current Outpatient  Medications   Medication Sig Dispense Refill    aspirin 81 MG EC tablet Take 1 tablet by mouth every morning      atorvastatin (LIPITOR) 20 MG tablet Take 10 mg by mouth every evening      cyanocobalamin (VITAMIN B-12) 1000 MCG tablet Take 1,000 mcg by mouth every morning      lisinopril (ZESTRIL) 2.5 MG tablet Take 2.5 mg by mouth every evening      metFORMIN (GLUCOPHAGE) 1000 MG tablet Take 1,000 mg by mouth 2 times daily (with meals)         Allergies  No Known Allergies    Social History  Social History     Socioeconomic History    Marital status:      Spouse name: Not on file    Number of children: Not on file    Years of education: Not on file    Highest education level: Not on file   Occupational History    Not on file   Tobacco Use    Smoking status: Former     Current packs/day: 0.00     Average packs/day: 1 pack/day for 17.0 years (17.0 ttl pk-yrs)     Types: Cigarettes     Start date: 1971     Quit date: 1988     Years since quittin.3     Passive exposure: Past    Smokeless tobacco: Never    Tobacco comments:     Smoke for 17 years   Substance and Sexual Activity    Alcohol use: Yes     Comment: on occassion    Drug use: Not Currently    Sexual activity: Not on file   Other Topics Concern    Not on file   Social History Narrative    Not on file     Social Determinants of Health     Financial Resource Strain: Not on file   Food Insecurity: Not on file   Transportation Needs: No Transportation Needs (2023)    Received from [facility], [facility]    PRAPARE - Transportation     Lack of Transportation (Medical): No     Lack of Transportation (Non-Medical): No   Physical Activity: Unknown (2023)    Received from [facility]    Exercise Vital Sign     Days of Exercise per Week: 5 days     Minutes of Exercise per Session: Not on file   Stress: No Stress Concern Present (2023)    Received from [facility], [facility]    [employer] [place] [employer] -  Occupational Stress Questionnaire     Feeling of Stress : Not at all   Social Connections: Unknown (2/20/2023)    Received from [facility]    Social Connection and Isolation Panel [NHANES]     Frequency of Communication with Friends and Family: Not on file     Frequency of Social Gatherings with Friends and Family: Not on file     Attends Hindu Services: Not on file     Active Member of Clubs or Organizations: No     Attends Club or Organization Meetings: Never     Marital Status:    Interpersonal Safety: Not At Risk (2/20/2023)    Received from [facility]    Humiliation, Afraid, Rape, and Kick questionnaire     Fear of Current or Ex-Partner: No     Emotionally Abused: No     Physically Abused: No     Sexually Abused: No   Housing Stability: Unknown (2/20/2023)    Received from [facility], [facility]    Housing Stability Vital Sign     Unable to Pay for Housing in the Last Year: Not on file     Number of Places Lived in the Last Year: Not on file     Unstable Housing in the Last Year: No       Family History  Family History   Problem Relation Age of Onset    Cancer Sister     Diabetes Sister     Diabetes Brother     Lymphoma Brother     Anesthesia Reaction No family hx of     Venous thrombosis No family hx of        Review of Systems  The complete review of systems is negative other than noted in the HPI or here.   Anesthesia Evaluation   Pt has had prior anesthetic. Type: MAC.    No history of anesthetic complications       ROS/MED HX  ENT/Pulmonary:     (+)     PHILOMENA risk factors,  hypertension,         tobacco use, Past use,                    (-) recent URI   Neurologic:  - neg neurologic ROS     Cardiovascular: Comment: Denies chest pain/pressure, HENRY, orthopnea, dizziness, palpitations, edema.     (+) Dyslipidemia hypertension- -   -  - -                                 Previous cardiac testing   Echo: Date: Results:    Stress Test:  Date: Results:    ECG Reviewed:  Date:  "2/2019 Results:  Normal Sinus Rhythm  Cath:  Date: Results:      METS/Exercise Tolerance: >4 METS Comment: Shoveling snow, yard work, house chores   Hematologic:  - neg hematologic  ROS     Musculoskeletal:   (+)  arthritis (right knee),             GI/Hepatic: Comment: Diverticulosis   (-) GERD and liver disease   Renal/Genitourinary:  - neg Renal ROS     Endo:     (+)  type II DM, Last HgA1c: 2/19/241, date: 6.5, Not using insulin,   not previously admitted for DM/DKA.       Obesity,    (-) thyroid disease   Psychiatric/Substance Use:  - neg psychiatric ROS     Infectious Disease:  - neg infectious disease ROS     Malignancy:   (+) Malignancy, History of Other.Other CA Choroid melanoma status post.    Other:  - neg other ROS          BP (!) 191/84 (BP Location: Right arm, Patient Position: Sitting, Cuff Size: Adult Regular)   Pulse 59   Temp 98  F (36.7  C) (Oral)   Resp 16   Ht 1.702 m (5' 7\")   Wt 90.6 kg (199 lb 11.2 oz)   SpO2 97%   BMI 31.28 kg/m      Physical Exam   Constitutional: Pleasant, well-appearing male, no apparent distress, and appears stated age.  Eyes: Pupils equal, round and reactive to light, extra ocular muscles intact, sclera clear, conjunctiva normal.  HENT: Normocephalic and atraumatic, oral pharynx with moist mucus membranes, good dentition. No goiter appreciated.   Respiratory: Clear to auscultation bilaterally, no crackles or wheezing.  Cardiovascular: Regular rate and rhythm, normal S1 and S2, and no murmur noted.  Carotids +2, no bruits. No edema. Palpable pulses to radial  DP and PT arteries.   GI: Normal bowel sounds, soft, non-distended, non-tender, no masses palpated, no hepatosplenomegaly.  Lymph/Hematologic: No cervical lymphadenopathy and no supraclavicular lymphadenopathy.  Genitourinary:  Deferred  Skin: Warm and dry.  No rashes on exposed skin   Musculoskeletal: Full ROM of neck. There is no redness, warmth, or swelling of the visible joints. Gross motor strength is " normal.    Neurologic: Awake, alert, oriented to name, place and time. Cranial nerves II-XII are grossly intact. Gait is normal.   Neuropsychiatric: Calm, cooperative. Normal affect.       Prior Labs/Diagnostic Studies   All labs and imaging personally reviewed     EKG/ stress test - if available please see in ROS above   No results found.    The patient's records and results personally reviewed by this provider.     Outside records reviewed from: Care Everywhere    LAB/DIAGNOSTIC STUDIES TODAY:  None    Assessment  Benson Moeller is a 83 year old male seen as a PAC referral for risk assessment and optimization for anesthesia.    Plan/Recommendations  Pt will be optimized for the proposed procedure.  See below for details on the assessment, risk, and preoperative recommendations    NEUROLOGY  - No history of TIA, CVA or seizure    -Post Op delirium risk factors:  Age    HEENT  - Choroid melanoma, left eye  Above procedure planned for further management    - No current airway concerns.  Will need to be reassessed day of surgery.  Mallampati: I  TM: > 3    CARDIAC  - No history of CAD and Afib  - Hypertension  Blood pressure elevated at today's exam.  Patient has been asked to increase daily lisinopril dose to 5 mg nightly and check his BP 2-3 times daily at home leading up to surgery. Patient was also instructed to schedule a PCP visit in the near future for long term management. Will check in with patient on Thursday this week for updated BP readings.     - Hyperlipidemia  Well controlled on home regimen  - METS (Metabolic Equivalents)  Patient performs 4 or more METS exercise without symptoms             Total Score: 0      RCRI-Very low risk: Class 1 0.4% complication rate             Total Score: 0        PULMONARY    PHILOMENA Medium Risk             Total Score: 3    PHILOMENA: Hypertension    PHILOMENA: Over 50 ys old    PHILOMENA: Male      - Denies asthma or inhaler use  - Tobacco History    History   Smoking Status    Former     "Types: Cigarettes   Smokeless Tobacco    Never       GI  - Diverticulosis/Diverticulitis  Incidentally noted on imaging completed by oncology on 4/5/24. Patient opted to proceed with observation alone. No recurrent symptoms    PONV Low Risk  Total Score: 1           1 AN PONV: Patient is not a current smoker        /RENAL  - Baseline Creatinine  1.03    ENDOCRINE    - BMI: Estimated body mass index is 31.28 kg/m  as calculated from the following:    Height as of this encounter: 1.702 m (5' 7\").    Weight as of this encounter: 90.6 kg (199 lb 11.2 oz).  Obesity (BMI >30)    - Diabetes Mellitus, Type 2, non-insulin dependent. Last A1c 2/19/24 was 6.5. Hold morning oral hypoglycemic medications. Recommend close monitoring of the patient's blood glucose levels throughout the perioperative period.    HEME  VTE High Risk 3%             Total Score: 8    VTE: Greater than 59 yrs old    VTE: Male    VTE: Current cancer      - Platelet disfunction secondary to Aspirin (Jeannie, many others). Hold 7 days prior to surgery.      MSK  - Arthritis, right knee  Recommend consideration for careful positioning to limit patient discomfort.    Different anesthesia methods/types have been discussed with the patient, but they are aware that the final plan will be decided by the assigned anesthesia provider on the date of service.    The patient is not yet optimized for their procedure due to hypertension. Will check in with patient on Thursday for updated home readings with increased antihypertensive dose.    AVS with information on surgery time/arrival time, meds and NPO status given by nursing staff. No further diagnostic testing indicated.      On the day of service:     Prep time: 11 minutes  Visit time: 13 minutes  Documentation time: 5 minutes  ------------------------------------------  Total time: 29 minutes      Emily Millard PA-C  Preoperative Assessment Center  [facility]  Clinic and Surgery " Center  Phone: [phone]  Fax: [phone]    [ADDENDUM] Patient has been monitoring BP at home with some improvement. BP has been <160/90. Patient was advised to continue lisinopril 5mg leading up to surgery and schedule a follow up with PCP after for long-term management plan. Patient is now optimized for planned cancer surgery.     Emily Millard PA-C on 4/25/2024 at 12:36 PM

## 2024-04-22 NOTE — PROGRESS NOTES
Cancer  Pertinent negatives include no fever, headaches, nausea or vomiting.       Date: 4/15/2024   Age: 83 year old  Ethnicity:    Sex: male  : 1940   Lives In: [place], [place]      Diagnosis: Left Choroidal Melanoma    Prior radiation therapy:   none    Prior chemotherapy:   none    RN time with patient: 55 minutes in person  Educated on Gamma Knife: yes    Doctors: Dr. Jenna Fonseca, Dr. Forest Pena, Dr. Maverick Mcnamara- eye doctor in [place], Dr. Wan Pimentel-family doctor in [place]    Pain at time of consult: pt denies pain at time of consult  Does pt have a living will: [t states he has  Does pt have implanted cardiac device: pt has no implanted cardiac device    Has pt fallen in past week: pt has not fallen  Does pt feel steady on his feet: pt feels steady on his feet    Review Since Diagnosis:  Pt notes that his vision in the left eye has been decreasing over time, really didn't realize how bad, now just a dark [place] if closes right   3/9/23; left eye lesion noted on a routine exam, measurements of lesion 0.3 h x 1.3 l x 1.0 w.  Suspicious right eye nevus, referred to a specialist  23; measurements of left choroidal lesion  0.3 h x 1.5 l x 1.1 w  23; pt saw Dr. Corrigan, ophthalmologist at [facility], pt has bilateral suspicious choroidal nevi.  Notes lesions stable since 2023, but highly suspicious for choroidal melanoma although not really growing and would be unusual to have bilateral.  In light of left lesion being larger than right presented treatment options.   Pt opted to observe   23; measurements of left choroidal lesion 0.4 h x 1.7 l x 1.0 w  24; met with Dr. Fonseca, lesion left eye measured 0.3 h x 1.7 l x 1.2 w.  To see Dr. Pena, felt nevus right eye stable  24; MRI Liver, multiple simple hepatic cysts, nodular enhancement consistent with a benign hepatic hemangioma, no evidence of mets   24; PET/CT, no evidence of  distant mets, few scattered sub 4 mm lung nodules, below size for PET characterization, uncomplicated sigmoid diverticulitis  4/5/24; pt saw Dr. Pena at the request of Dr. Fonseca, no mets, follow up every 3 months  4/5/24; Dr. Pena called pt regarding incidental finding of diverticulitis on scan, pt not real bothered by so no antibiotics   4/11/24; pt saw Dr. Fonseca in follow up, has nevus right eye, but choroidal melanoma left eye.  Pt having blurred vision and floaters both eyes.  Stable pigmented choroidal lesion right eye.  Pigmented elevated choroidal lesion left, increased pigmentation at borders since 3/9/23.  Dr. Fonseca notes that based on size of lesion, 0.3 h x 1.8 l x 1.2 w, suspicious features of SRF and thickness feels most likely is a slow growing choroidal melanoma.  Felt to be 2 mm from optic nerve.  Discussed treatment options in light of scans showing no mets.  Prefer to not have enucleation as has nevus on right eye that is being watched.  Discussed Gamma Knife as treatment of choice and pt agrees.  Emphasized need for close follow up with Dr. Pena and to monitor nevus right eye.  Refer to Dr. Landin     Chief Complaint: pt notes if covers right eye vision in left foggy, white and at times a dark [place] , no eye pain, no headaches   Review of Systems   Constitutional:  Negative for fever, malaise/fatigue and weight loss.   HENT:  Negative for hearing loss.    Eyes:  Positive for blurred vision.   Respiratory: Negative.     Cardiovascular: Negative.         Pt BP at PAC unit elevated today, pt instructed to double dose, so 5 mg po daily and will check BP daily and contact PAC unit on Thursday    Gastrointestinal:  Negative for blood in stool, constipation, diarrhea, nausea and vomiting.   Genitourinary: Negative.    Musculoskeletal: Negative.    Neurological:  Negative for seizures and headaches.   Psychiatric/Behavioral:  Negative for depression.

## 2024-04-22 NOTE — LETTER
4/22/2024         RE: Benson Moeller  [address] [facility] [address]        Dear Colleague,    Thank you for referring your patient, Benson Moeller, to the [facility] RADIATION ONCOLOGY GAMMA KNIFE. Please see a copy of my visit note below.    Radiation Oncology Consultation:  Date on this visit: 4/22/2024    Benson Moeller  is referred by Dr Fonseca  provider found for a radiation oncology consultation. He requires evaluation for diagnosis of Choroidal melanom of the LEFT eye       History Of Present Illness:  Mr. Moeller is a 83 year old male who presents with a diagnosis of  Choroidal melanoma of the LEFT eye .       In May  of 2023 he was found to have bilateral choroidal lesions, of uncertain biology.   MRi of the liver revealed no suspicion for mets in the liver.   The Chest CT revealed only a 4 mm lll nodule        Re-evaluation in 9/23  showed the eye lesions to be stable.  The left lesion was more concerning for melanoma.     This was through Dr Corrigan at [facility].     He elected observation    He was seen on 2/29/24 by Dr Fonseca who noted that the lesions were stable  left eye 2.97 (h) x 16.82 mm (l) x 12.15 mm (w); right eye: 2.02 mm (h) x 7.43 mm (l) x 6.87 mm (w).  The lesion on the left was deemed to be a slowly growing choroidal melanoma.  is visual accuity in the left is poor.      Due to the location of the tumor, Dr Fonseca believes GK radiosurgery would be preferred .   The lesion is posteior and nasal    Repeat MRI of the liver on 4/4 24 revealed only a 3.3 cm of what was thought to be a hemangioma.   PET scan and  Ct of the CAP on 4/4 24 revealed  no evidence for metastasis  ( a few scattered sub 4 mm nodues below resolution of PET)       Past Medical/Surgical History:  Past Medical History:   Diagnosis Date     Arthritis      Choroidal malignant melanoma, left (H)      Diabetes mellitus, type 2 (H)      HTN (hypertension)      Hypercholesteremia      No past surgical history on  file.    Past Radiation History: none  Past Chemotherapy History:  Implanted Cardiac device:   none  Advanced directive  :      Review of Systems:  Reviewed.  See details in nursing note    Allergies:  Allergies as of 2024     (No Known Allergies)       Current Medications:     Current Outpatient Medications:      aspirin 81 MG EC tablet, Take 1 tablet by mouth daily, Disp: , Rfl:      atorvastatin (LIPITOR) 20 MG tablet, Take 10 mg by mouth, Disp: , Rfl:      cyanocobalamin (VITAMIN B-12) 1000 MCG tablet, Take 1,000 mcg by mouth daily, Disp: , Rfl:      lisinopril (ZESTRIL) 2.5 MG tablet, Take 2.5 mg by mouth, Disp: , Rfl:      metFORMIN (GLUCOPHAGE) 1000 MG tablet, Take 1,000 mg by mouth, Disp: , Rfl:     Family History:  Family History   Problem Relation Age of Onset     Cancer Sister      Diabetes Sister      Diabetes Brother      Lymphoma Brother        Social History:  Social History     Socioeconomic History     Marital status:      Spouse name: Not on file     Number of children: Not on file     Years of education: Not on file     Highest education level: Not on file   Occupational History     Not on file   Tobacco Use     Smoking status: Former     Current packs/day: 0.00     Average packs/day: 1 pack/day for 17.0 years (17.0 ttl pk-yrs)     Types: Cigarettes     Start date: 1971     Quit date: 1988     Years since quittin.3     Passive exposure: Past     Smokeless tobacco: Never     Tobacco comments:     Smoke for 17 years   Substance and Sexual Activity     Alcohol use: Not Currently     Comment: on occassion     Drug use: Not Currently     Sexual activity: Not on file   Other Topics Concern     Not on file   Social History Narrative     Not on file     Social Determinants of Health     Financial Resource Strain: Not on file   Food Insecurity: Not on file   Transportation Needs: No Transportation Needs (2023)    Received from [facility], [facility]  "Transportation      Lack of Transportation (Medical): No      Lack of Transportation (Non-Medical): No   Physical Activity: Unknown (2/20/2023)    Received from [facility]    Exercise Vital Sign      Days of Exercise per Week: 5 days      Minutes of Exercise per Session: Not on file   Stress: No Stress Concern Present (2/20/2023)    Received from [facility]    [employer] - Occupational Stress Questionnaire      Feeling of Stress : Not at all   Social Connections: Unknown (2/20/2023)    Received from [facility]    Social Connection and Isolation Panel [NHANES]      Frequency of Communication with Friends and Family: Not on file      Frequency of Social Gatherings with Friends and Family: Not on file      Attends Shinto Services: Not on file      Active Member of Clubs or Organizations: No      Attends Club or Organization Meetings: Never      Marital Status:    Interpersonal Safety: Not At Risk (2/20/2023)    Received from [facility]    Humiliation, Afraid, Rape, and Kick questionnaire      Fear of Current or Ex-Partner: No      Emotionally Abused: No      Physically Abused: No      Sexually Abused: No   Housing Stability: Unknown (2/20/2023)    Received from [facility]    Housing Stability Vital Sign      Unable to Pay for Housing in the Last Year: Not on file      Number of Places Lived in the Last Year: Not on file      In the last 12 months, was there a time when you did not have a steady place to sleep or slept in a shelter (including now)?: No       Physical Exam:  There were no vitals taken for this visit.  BP (!) 176/84   Pulse 61   Resp 18   Ht 1.753 m (5' 9\")   Wt 88.5 kg (195 lb)   SpO2 96%   BMI 28.80 kg/m     GENERAL: Healthy, alert and no distress  EYES: Eyes grossly normal to inspection.  No discharge or erythema, or obvious scleral/conjunctival abnormalities.  RESP: No audible wheeze, " cough, or visible cyanosis.  No visible retractions or increased work of breathing.    SKIN: Visible skin clear. No significant rash, abnormal pigmentation or lesions.  NEURO: Cranial nerves grossly intact.  Mentation and speech appropriate for age.  PSYCH: Mentation appears normal, affect normal/bright, judgement and insight intact, normal speech and appearance well-groomed.       Pathology:  none    Laboratory/Imaging Studies  reviewed  MRIs, PETs and CT     ASSESSMENT:    left sided nasal uveal melanoma and right sided suspicious choroidal nevus,    MRI of the liver in September showed lytic lesions at T8, however, these were ultimately deemed to be likely benign, and subsequent imaging has not demonstrated concern for metastatic disease    RECOMMENDATION:     He is an excellent candidate for Gammaknife Radiosurgery to the CMM in the left eye.  It is slowly growing but in a location that makes a COMS plaque challenging .       The risks and benefits of radiotherapy were discussed with the patient.  Potential acute and long term side effects were explained.   His vision is the left is very poor.    The patient agrees to proceed with Gammaknife radiation therapy.  A written consent was obtained.    Thank you for allowing me to participate in Benson Moeller 's care .  Please do not hesitate to call me with questions.    Bertha Landin MD  [phone]   Pager   [phone]  [facility]   [phone] [place]  [phone] [facility]  Primary Physician: Wan Vaughan   Patient Care Team:  Wan Vaughan MD as PCP - General  Jenna Fonseca MD as Physician (Ophthalmology)  Forest Pena MD as MD (Hematology & Oncology)  Lynn Mike, VENUS as Specialty Care Coordinator (Hematology & Oncology)  Emily Millard PA-C as Physician Assistant (Anesthesiology)                    Cancer  Pertinent negatives include no fever, headaches, nausea or vomiting.       Date: 4/15/2024   Age: 83 year old  Ethnicity:     Sex: male  : 1940   Lives In: [place], [place]      Diagnosis: Left Choroidal Melanoma    Prior radiation therapy:   none    Prior chemotherapy:   none    RN time with patient: 55 minutes in person  Educated on Gamma Knife: yes    Doctors: Dr. Jenna Fonseca, Dr. Forest Pena, Dr. Maverick Mcnamara- eye doctor in [place], Dr. Wan Pimentel-family doctor in [place]    Pain at time of consult: pt denies pain at time of consult  Does pt have a living will: [t states he has  Does pt have implanted cardiac device: pt has no implanted cardiac device    Has pt fallen in past week: pt has not fallen  Does pt feel steady on his feet: pt feels steady on his feet    Review Since Diagnosis:  Pt notes that his vision in the left eye has been decreasing over time, really didn't realize how bad, now just a dark [place] if closes right   3/9/23; left eye lesion noted on a routine exam, measurements of lesion 0.3 h x 1.3 l x 1.0 w.  Suspicious right eye nevus, referred to a specialist  23; measurements of left choroidal lesion  0.3 h x 1.5 l x 1.1 w  23; pt saw Dr. Corrigan, ophthalmologist at [facility], pt has bilateral suspicious choroidal nevi.  Notes lesions stable since 2023, but highly suspicious for choroidal melanoma although not really growing and would be unusual to have bilateral.  In light of left lesion being larger than right presented treatment options.   Pt opted to observe   23; measurements of left choroidal lesion 0.4 h x 1.7 l x 1.0 w  24; met with Dr. Fonseca, lesion left eye measured 0.3 h x 1.7 l x 1.2 w.  To see Dr. Pena, felt nevus right eye stable  24; MRI Liver, multiple simple hepatic cysts, nodular enhancement consistent with a benign hepatic hemangioma, no evidence of mets   24; PET/CT, no evidence of distant mets, few scattered sub 4 mm lung nodules, below size for PET characterization, uncomplicated sigmoid diverticulitis  24; pt saw   Jean at the request of Dr. Fonseca, no mets, follow up every 3 months  4/5/24; Dr. Pena called pt regarding incidental finding of diverticulitis on scan, pt not real bothered by so no antibiotics   4/11/24; pt saw Dr. Fonseca in follow up, has nevus right eye, but choroidal melanoma left eye.  Pt having blurred vision and floaters both eyes.  Stable pigmented choroidal lesion right eye.  Pigmented elevated choroidal lesion left, increased pigmentation at borders since 3/9/23.  Dr. Fonseca notes that based on size of lesion, 0.3 h x 1.8 l x 1.2 w, suspicious features of SRF and thickness feels most likely is a slow growing choroidal melanoma.  Felt to be 2 mm from optic nerve.  Discussed treatment options in light of scans showing no mets.  Prefer to not have enucleation as has nevus on right eye that is being watched.  Discussed Gamma Knife as treatment of choice and pt agrees.  Emphasized need for close follow up with Dr. Pena and to monitor nevus right eye.  Refer to Dr. Landin     Chief Complaint: pt notes if covers right eye vision in left foggy, white and at times a dark [place] , no eye pain, no headaches   Review of Systems   Constitutional:  Negative for fever, malaise/fatigue and weight loss.   HENT:  Negative for hearing loss.    Eyes:  Positive for blurred vision.   Respiratory: Negative.     Cardiovascular: Negative.         Pt BP at PAC unit elevated today, pt instructed to double dose, so 5 mg po daily and will check BP daily and contact PAC unit on Thursday    Gastrointestinal:  Negative for blood in stool, constipation, diarrhea, nausea and vomiting.   Genitourinary: Negative.    Musculoskeletal: Negative.    Neurological:  Negative for seizures and headaches.   Psychiatric/Behavioral:  Negative for depression.                  Again, thank you for allowing me to participate in the care of your patient.        Sincerely,        Bertha Landin MD

## 2024-04-25 ENCOUNTER — TELEPHONE (OUTPATIENT)
Dept: SURGERY | Facility: CLINIC | Age: 84
End: 2024-04-25
Payer: MEDICARE

## 2024-04-25 NOTE — TELEPHONE ENCOUNTER
Benson reported the following blood pressures after increasing his Lisinopril dose: 4/22 - 152/97; 4/23 - 129/89; 4/24 - 160/86 and 150/85.  Will follow up.  Adilene Fernandez RN

## 2024-04-26 ENCOUNTER — TELEPHONE (OUTPATIENT)
Dept: SURGERY | Facility: CLINIC | Age: 84
End: 2024-04-26
Payer: MEDICARE

## 2024-04-26 NOTE — TELEPHONE ENCOUNTER
Updated Benson's wife that he should continue the 5 mg lisinopril through surgery then follow up with his PCP for long term management of dosing. This is per Vi Salazar NP at the PAC clinic.  Benson's wife expressed understanding.  Adilene Fernandez RN

## 2024-04-28 NOTE — ANESTHESIA PREPROCEDURE EVALUATION
Anesthesia Pre-Procedure Evaluation    Patient: Benson Moeller   MRN: [id number] : 1940        Procedure : Procedure(s):  LEFT EYE IMMOBILIZATION@1652-6803 IN MAIN OR  GAMMA KNIFE HEAD FRAME PLACEMENT@2666-3172 IN MAIN OR  Anesthesia IN THE MAIN OR MAGNETIC RESONANCE IMAGING@4054-0820,TREATMENT PLANNING@3392-3083  ANESTHESIA OUT OF OR RADIATION ONCOLOGY GAMMA KNIFE TREATMENT/FRAME REMOVAL @8538-9054 AND 1130 PACU          Past Medical History:   Diagnosis Date    Arthritis     Choroidal malignant melanoma, left (H)     Diabetes mellitus, type 2 (H)     HTN (hypertension)     Hypercholesteremia       Past Surgical History:   Procedure Laterality Date    COLONOSCOPY      x 4      No Known Allergies   Social History     Tobacco Use    Smoking status: Former     Current packs/day: 0.00     Average packs/day: 1 pack/day for 17.0 years (17.0 ttl pk-yrs)     Types: Cigarettes     Start date: 1971     Quit date: 1988     Years since quittin.3     Passive exposure: Past    Smokeless tobacco: Never    Tobacco comments:     Smoke for 17 years   Substance Use Topics    Alcohol use: Yes     Comment: on occassion      Wt Readings from Last 1 Encounters:   24 88.5 kg (195 lb)        Anesthesia Evaluation   Pt has not had prior anesthetic         ROS/MED HX  ENT/Pulmonary:  - neg pulmonary ROS     Neurologic:  - neg neurologic ROS     Cardiovascular:     (+) Dyslipidemia hypertension- -   -  - -                                      METS/Exercise Tolerance: >4 METS Comment: Shoveling snow. Physically active    Hematologic:  - neg hematologic  ROS     Musculoskeletal:   (+)  arthritis,             GI/Hepatic:  - neg GI/hepatic ROS     Renal/Genitourinary:    (-) BPH   Endo: Comment: Diabetes well-controlled hemoglobin A1c 6.5    (+)  type II DM,                 (-) Type I DM   Psychiatric/Substance Use:  - neg psychiatric ROS     Infectious Disease:  - neg infectious disease ROS     Malignancy:   (+)  "Malignancy, History of Other.Other CA Choroidal malignant melanoma Active status post Surgery.    Other:            Physical Exam    Airway        Mallampati: II   TM distance: > 3 FB   Neck ROM: full   Mouth opening: > 3 cm    Respiratory Devices and Support         Dental       (+) Minor Abnormalities - some fillings, tiny chips      Cardiovascular   cardiovascular exam normal          Pulmonary   pulmonary exam normal                OUTSIDE LABS:  CBC:   Lab Results   Component Value Date    WBC 7.9 04/04/2024    HGB 13.9 04/04/2024    HCT 40.4 04/04/2024     04/04/2024     BMP:   Lab Results   Component Value Date     04/04/2024    POTASSIUM 4.6 04/04/2024    CHLORIDE 101 04/04/2024    CO2 24 04/04/2024    BUN 10.1 04/04/2024    CR 1.0 04/04/2024    CR 1.03 04/04/2024     (H) 04/04/2024     COAGS: No results found for: \"PTT\", \"INR\", \"FIBR\"  POC: No results found for: \"BGM\", \"HCG\", \"HCGS\"  HEPATIC:   Lab Results   Component Value Date    ALBUMIN 4.1 04/04/2024    PROTTOTAL 7.2 04/04/2024    ALT 16 04/04/2024    AST 17 04/04/2024    ALKPHOS 77 04/04/2024    BILITOTAL 0.7 04/04/2024     OTHER:   Lab Results   Component Value Date    AVIVA 9.1 04/04/2024       Anesthesia Plan    ASA Status:  2    NPO Status:  NPO Appropriate    Anesthesia Type: General.     - Airway: ETT      Maintenance: Balanced.   Techniques and Equipment:     - Lines/Monitors: 2nd IV, BIS     Consents    Anesthesia Plan(s) and associated risks, benefits, and realistic alternatives discussed. Questions answered and patient/representative(s) expressed understanding.     - Discussed: Risks, Benefits and Alternatives for BOTH SEDATION and the PROCEDURE were discussed     - Discussed with:  Patient, Spouse      - Extended Intubation/Ventilatory Support Discussed: No.      - Patient is DNR/DNI Status: No     Use of blood products discussed: No .     Postoperative Care    Pain management: IV analgesics, Multi-modal analgesia.   PONV " "prophylaxis: Ondansetron (or other 5HT-3)     Comments:               Konrad Sung MD    I have reviewed the pertinent notes and labs in the chart from the past 30 days and (re)examined the patient.  Any updates or changes from those notes are reflected in this note.              # Overweight: Estimated body mass index is 28.8 kg/m  as calculated from the following:    Height as of 4/22/24: 1.753 m (5' 9\").    Weight as of 4/22/24: 88.5 kg (195 lb).      "

## 2024-04-29 ENCOUNTER — HOSPITAL ENCOUNTER (OUTPATIENT)
Facility: CLINIC | Age: 84
Discharge: HOME | End: 2024-04-29
Attending: STUDENT IN AN ORGANIZED HEALTH CARE EDUCATION/TRAINING PROGRAM | Admitting: STUDENT IN AN ORGANIZED HEALTH CARE EDUCATION/TRAINING PROGRAM
Payer: MEDICARE

## 2024-04-29 ENCOUNTER — OFFICE VISIT (OUTPATIENT)
Dept: RADIATION ONCOLOGY | Facility: CLINIC | Age: 84
End: 2024-04-29
Attending: RADIOLOGY
Payer: MEDICARE

## 2024-04-29 ENCOUNTER — HOSPITAL ENCOUNTER (OUTPATIENT)
Dept: MRI IMAGING | Facility: CLINIC | Age: 84
Discharge: HOME | End: 2024-04-29
Attending: RADIOLOGY
Payer: MEDICARE

## 2024-04-29 ENCOUNTER — ANESTHESIA (OUTPATIENT)
Dept: SURGERY | Facility: CLINIC | Age: 84
End: 2024-04-29
Payer: MEDICARE

## 2024-04-29 ENCOUNTER — OFFICE VISIT (OUTPATIENT)
Dept: RADIATION ONCOLOGY | Facility: CLINIC | Age: 84
End: 2024-04-29
Attending: NEUROLOGICAL SURGERY
Payer: MEDICARE

## 2024-04-29 VITALS
DIASTOLIC BLOOD PRESSURE: 124 MMHG | BODY MASS INDEX: 29.36 KG/M2 | TEMPERATURE: 98 F | WEIGHT: 198.19 LBS | OXYGEN SATURATION: 97 % | SYSTOLIC BLOOD PRESSURE: 138 MMHG | HEART RATE: 96 BPM | HEIGHT: 69 IN | RESPIRATION RATE: 14 BRPM

## 2024-04-29 DIAGNOSIS — C69.32 CHOROID MELANOMA OF LEFT EYE (H): ICD-10-CM

## 2024-04-29 DIAGNOSIS — C69.32 CHOROIDAL MALIGNANT MELANOMA, LEFT (H): Primary | ICD-10-CM

## 2024-04-29 DIAGNOSIS — C69.32 CHOROID MELANOMA OF LEFT EYE (H): Primary | ICD-10-CM

## 2024-04-29 LAB
GLUCOSE BLDC GLUCOMTR-MCNC: 144 MG/DL (ref 70–99)
GLUCOSE BLDC GLUCOMTR-MCNC: 169 MG/DL (ref 70–99)

## 2024-04-29 PROCEDURE — 999N000141 HC STATISTIC PRE-PROCEDURE NURSING ASSESSMENT: Performed by: STUDENT IN AN ORGANIZED HEALTH CARE EDUCATION/TRAINING PROGRAM

## 2024-04-29 PROCEDURE — 77334 RADIATION TREATMENT AID(S): CPT | Mod: 26 | Performed by: RADIOLOGY

## 2024-04-29 PROCEDURE — A9585 GADOBUTROL INJECTION: HCPCS | Performed by: RADIOLOGY

## 2024-04-29 PROCEDURE — 710N000012 HC RECOVERY PHASE 2, PER MINUTE: Performed by: STUDENT IN AN ORGANIZED HEALTH CARE EDUCATION/TRAINING PROGRAM

## 2024-04-29 PROCEDURE — 99100 ANES PT EXTEME AGE<1 YR&>70: CPT | Performed by: ANESTHESIOLOGY

## 2024-04-29 PROCEDURE — 272N000001 HC OR GENERAL SUPPLY STERILE: Performed by: STUDENT IN AN ORGANIZED HEALTH CARE EDUCATION/TRAINING PROGRAM

## 2024-04-29 PROCEDURE — 258N000003 HC RX IP 258 OP 636

## 2024-04-29 PROCEDURE — 77295 3-D RADIOTHERAPY PLAN: CPT | Mod: 26 | Performed by: RADIOLOGY

## 2024-04-29 PROCEDURE — 250N000011 HC RX IP 250 OP 636: Performed by: STUDENT IN AN ORGANIZED HEALTH CARE EDUCATION/TRAINING PROGRAM

## 2024-04-29 PROCEDURE — 99100 ANES PT EXTEME AGE<1 YR&>70: CPT

## 2024-04-29 PROCEDURE — 61798 SRS CRANIAL LESION COMPLEX: CPT

## 2024-04-29 PROCEDURE — 61798 SRS CRANIAL LESION COMPLEX: CPT | Performed by: ANESTHESIOLOGY

## 2024-04-29 PROCEDURE — 82962 GLUCOSE BLOOD TEST: CPT

## 2024-04-29 PROCEDURE — 77371 SRS MULTISOURCE: CPT | Performed by: RADIOLOGY

## 2024-04-29 PROCEDURE — 77300 RADIATION THERAPY DOSE PLAN: CPT | Mod: 26 | Performed by: RADIOLOGY

## 2024-04-29 PROCEDURE — 77300 RADIATION THERAPY DOSE PLAN: CPT | Performed by: RADIOLOGY

## 2024-04-29 PROCEDURE — 250N000009 HC RX 250: Performed by: STUDENT IN AN ORGANIZED HEALTH CARE EDUCATION/TRAINING PROGRAM

## 2024-04-29 PROCEDURE — 77370 RADIATION PHYSICS CONSULT: CPT | Performed by: RADIOLOGY

## 2024-04-29 PROCEDURE — 77295 3-D RADIOTHERAPY PLAN: CPT | Performed by: RADIOLOGY

## 2024-04-29 PROCEDURE — 77432 STEREOTACTIC RADIATION TRMT: CPT | Performed by: RADIOLOGY

## 2024-04-29 PROCEDURE — 710N000010 HC RECOVERY PHASE 1, LEVEL 2, PER MIN: Performed by: STUDENT IN AN ORGANIZED HEALTH CARE EDUCATION/TRAINING PROGRAM

## 2024-04-29 PROCEDURE — 360N000074 HC SURGERY LEVEL 1, PER MIN: Performed by: STUDENT IN AN ORGANIZED HEALTH CARE EDUCATION/TRAINING PROGRAM

## 2024-04-29 PROCEDURE — 250N000009 HC RX 250

## 2024-04-29 PROCEDURE — 250N000024 HC ISOFLURANE, PER MIN: Performed by: STUDENT IN AN ORGANIZED HEALTH CARE EDUCATION/TRAINING PROGRAM

## 2024-04-29 PROCEDURE — 370N000017 HC ANESTHESIA TECHNICAL FEE, PER MIN: Performed by: STUDENT IN AN ORGANIZED HEALTH CARE EDUCATION/TRAINING PROGRAM

## 2024-04-29 PROCEDURE — G1010 CDSM STANSON: HCPCS

## 2024-04-29 PROCEDURE — 99203 OFFICE O/P NEW LOW 30 MIN: CPT | Mod: 25 | Performed by: NEUROLOGICAL SURGERY

## 2024-04-29 PROCEDURE — 250N000011 HC RX IP 250 OP 636

## 2024-04-29 PROCEDURE — G1010 CDSM STANSON: HCPCS | Performed by: RADIOLOGY

## 2024-04-29 PROCEDURE — 77334 RADIATION TREATMENT AID(S): CPT | Performed by: RADIOLOGY

## 2024-04-29 PROCEDURE — 70552 MRI BRAIN STEM W/DYE: CPT | Mod: 26 | Performed by: RADIOLOGY

## 2024-04-29 PROCEDURE — 250N000025 HC SEVOFLURANE, PER MIN: Performed by: STUDENT IN AN ORGANIZED HEALTH CARE EDUCATION/TRAINING PROGRAM

## 2024-04-29 PROCEDURE — 70552 MRI BRAIN STEM W/DYE: CPT | Mod: MG

## 2024-04-29 PROCEDURE — 255N000002 HC RX 255 OP 636: Performed by: RADIOLOGY

## 2024-04-29 RX ORDER — DEXAMETHASONE SODIUM PHOSPHATE 4 MG/ML
INJECTION, SOLUTION INTRA-ARTICULAR; INTRALESIONAL; INTRAMUSCULAR; INTRAVENOUS; SOFT TISSUE PRN
Status: DISCONTINUED | OUTPATIENT
Start: 2024-04-29 | End: 2024-04-29

## 2024-04-29 RX ORDER — NALOXONE HYDROCHLORIDE 0.4 MG/ML
0.1 INJECTION, SOLUTION INTRAMUSCULAR; INTRAVENOUS; SUBCUTANEOUS
Status: DISCONTINUED | OUTPATIENT
Start: 2024-04-29 | End: 2024-04-29 | Stop reason: HOSPADM

## 2024-04-29 RX ORDER — HYDROMORPHONE HCL IN WATER/PF 6 MG/30 ML
0.2 PATIENT CONTROLLED ANALGESIA SYRINGE INTRAVENOUS EVERY 5 MIN PRN
Status: DISCONTINUED | OUTPATIENT
Start: 2024-04-29 | End: 2024-04-29 | Stop reason: HOSPADM

## 2024-04-29 RX ORDER — ONDANSETRON 4 MG/1
4 TABLET, ORALLY DISINTEGRATING ORAL EVERY 30 MIN PRN
Status: DISCONTINUED | OUTPATIENT
Start: 2024-04-29 | End: 2024-04-29 | Stop reason: HOSPADM

## 2024-04-29 RX ORDER — NEOMYCIN SULFATE, POLYMYXIN B SULFATE AND DEXAMETHASONE 3.5; 10000; 1 MG/ML; [USP'U]/ML; MG/ML
SUSPENSION/ DROPS OPHTHALMIC
COMMUNITY
Start: 2024-04-17

## 2024-04-29 RX ORDER — IPRATROPIUM BROMIDE AND ALBUTEROL SULFATE 2.5; .5 MG/3ML; MG/3ML
3 SOLUTION RESPIRATORY (INHALATION) ONCE
Status: COMPLETED | OUTPATIENT
Start: 2024-04-29 | End: 2024-04-29

## 2024-04-29 RX ORDER — TROPICAMIDE 10 MG/ML
1 SOLUTION/ DROPS OPHTHALMIC
Status: DISCONTINUED | OUTPATIENT
Start: 2024-04-29 | End: 2024-04-29 | Stop reason: HOSPADM

## 2024-04-29 RX ORDER — FENTANYL CITRATE 50 UG/ML
50 INJECTION, SOLUTION INTRAMUSCULAR; INTRAVENOUS EVERY 5 MIN PRN
Status: DISCONTINUED | OUTPATIENT
Start: 2024-04-29 | End: 2024-04-29 | Stop reason: HOSPADM

## 2024-04-29 RX ORDER — PHENYLEPHRINE HYDROCHLORIDE 25 MG/ML
1 SOLUTION/ DROPS OPHTHALMIC
Status: DISCONTINUED | OUTPATIENT
Start: 2024-04-29 | End: 2024-04-29 | Stop reason: HOSPADM

## 2024-04-29 RX ORDER — KETOROLAC TROMETHAMINE 30 MG/ML
15 INJECTION, SOLUTION INTRAMUSCULAR; INTRAVENOUS
Status: DISCONTINUED | OUTPATIENT
Start: 2024-04-29 | End: 2024-04-29 | Stop reason: HOSPADM

## 2024-04-29 RX ORDER — FENTANYL CITRATE 50 UG/ML
25 INJECTION, SOLUTION INTRAMUSCULAR; INTRAVENOUS EVERY 5 MIN PRN
Status: DISCONTINUED | OUTPATIENT
Start: 2024-04-29 | End: 2024-04-29 | Stop reason: HOSPADM

## 2024-04-29 RX ORDER — SODIUM CHLORIDE, SODIUM LACTATE, POTASSIUM CHLORIDE, CALCIUM CHLORIDE 600; 310; 30; 20 MG/100ML; MG/100ML; MG/100ML; MG/100ML
INJECTION, SOLUTION INTRAVENOUS CONTINUOUS
Status: DISCONTINUED | OUTPATIENT
Start: 2024-04-29 | End: 2024-04-29 | Stop reason: HOSPADM

## 2024-04-29 RX ORDER — HYDROMORPHONE HCL IN WATER/PF 6 MG/30 ML
0.4 PATIENT CONTROLLED ANALGESIA SYRINGE INTRAVENOUS EVERY 5 MIN PRN
Status: DISCONTINUED | OUTPATIENT
Start: 2024-04-29 | End: 2024-04-29 | Stop reason: HOSPADM

## 2024-04-29 RX ORDER — LIDOCAINE HYDROCHLORIDE 20 MG/ML
INJECTION, SOLUTION INFILTRATION; PERINEURAL PRN
Status: DISCONTINUED | OUTPATIENT
Start: 2024-04-29 | End: 2024-04-29

## 2024-04-29 RX ORDER — ONDANSETRON 2 MG/ML
4 INJECTION INTRAMUSCULAR; INTRAVENOUS EVERY 30 MIN PRN
Status: DISCONTINUED | OUTPATIENT
Start: 2024-04-29 | End: 2024-04-29 | Stop reason: HOSPADM

## 2024-04-29 RX ORDER — SODIUM CHLORIDE, SODIUM LACTATE, POTASSIUM CHLORIDE, CALCIUM CHLORIDE 600; 310; 30; 20 MG/100ML; MG/100ML; MG/100ML; MG/100ML
INJECTION, SOLUTION INTRAVENOUS CONTINUOUS PRN
Status: DISCONTINUED | OUTPATIENT
Start: 2024-04-29 | End: 2024-04-29

## 2024-04-29 RX ORDER — PROPOFOL 10 MG/ML
INJECTION, EMULSION INTRAVENOUS PRN
Status: DISCONTINUED | OUTPATIENT
Start: 2024-04-29 | End: 2024-04-29

## 2024-04-29 RX ORDER — OXYCODONE HYDROCHLORIDE 5 MG/1
5 TABLET ORAL
Status: DISCONTINUED | OUTPATIENT
Start: 2024-04-29 | End: 2024-04-29 | Stop reason: HOSPADM

## 2024-04-29 RX ORDER — VASOPRESSIN IN 0.9 % NACL 2 UNIT/2ML
SYRINGE (ML) INTRAVENOUS PRN
Status: DISCONTINUED | OUTPATIENT
Start: 2024-04-29 | End: 2024-04-29

## 2024-04-29 RX ORDER — ONDANSETRON 2 MG/ML
INJECTION INTRAMUSCULAR; INTRAVENOUS PRN
Status: DISCONTINUED | OUTPATIENT
Start: 2024-04-29 | End: 2024-04-29

## 2024-04-29 RX ORDER — OXYCODONE HYDROCHLORIDE 10 MG/1
10 TABLET ORAL
Status: DISCONTINUED | OUTPATIENT
Start: 2024-04-29 | End: 2024-04-29 | Stop reason: HOSPADM

## 2024-04-29 RX ORDER — FENTANYL CITRATE 50 UG/ML
INJECTION, SOLUTION INTRAMUSCULAR; INTRAVENOUS PRN
Status: DISCONTINUED | OUTPATIENT
Start: 2024-04-29 | End: 2024-04-29

## 2024-04-29 RX ORDER — LIDOCAINE 40 MG/G
CREAM TOPICAL
Status: DISCONTINUED | OUTPATIENT
Start: 2024-04-29 | End: 2024-04-29 | Stop reason: HOSPADM

## 2024-04-29 RX ORDER — PROPOFOL 10 MG/ML
INJECTION, EMULSION INTRAVENOUS CONTINUOUS PRN
Status: DISCONTINUED | OUTPATIENT
Start: 2024-04-29 | End: 2024-04-29

## 2024-04-29 RX ORDER — ERYTHROMYCIN 5 MG/G
OINTMENT OPHTHALMIC PRN
Status: DISCONTINUED | OUTPATIENT
Start: 2024-04-29 | End: 2024-04-29 | Stop reason: HOSPADM

## 2024-04-29 RX ORDER — CYCLOPENTOLATE HYDROCHLORIDE 10 MG/ML
1 SOLUTION/ DROPS OPHTHALMIC
Status: DISCONTINUED | OUTPATIENT
Start: 2024-04-29 | End: 2024-04-29 | Stop reason: HOSPADM

## 2024-04-29 RX ORDER — ACETAMINOPHEN 325 MG/1
975 TABLET ORAL ONCE
Status: DISCONTINUED | OUTPATIENT
Start: 2024-04-29 | End: 2024-04-29 | Stop reason: HOSPADM

## 2024-04-29 RX ORDER — GLYCOPYRROLATE 0.2 MG/ML
INJECTION, SOLUTION INTRAMUSCULAR; INTRAVENOUS PRN
Status: DISCONTINUED | OUTPATIENT
Start: 2024-04-29 | End: 2024-04-29

## 2024-04-29 RX ORDER — GADOBUTROL 604.72 MG/ML
10 INJECTION INTRAVENOUS ONCE
Status: COMPLETED | OUTPATIENT
Start: 2024-04-29 | End: 2024-04-29

## 2024-04-29 RX ADMIN — SODIUM CHLORIDE, POTASSIUM CHLORIDE, SODIUM LACTATE AND CALCIUM CHLORIDE: 600; 310; 30; 20 INJECTION, SOLUTION INTRAVENOUS at 07:34

## 2024-04-29 RX ADMIN — TROPICAMIDE 1 DROP: 10 SOLUTION/ DROPS OPHTHALMIC at 07:13

## 2024-04-29 RX ADMIN — PHENYLEPHRINE HYDROCHLORIDE 100 MCG: 10 INJECTION INTRAVENOUS at 10:03

## 2024-04-29 RX ADMIN — FENTANYL CITRATE 50 MCG: 50 INJECTION INTRAMUSCULAR; INTRAVENOUS at 12:45

## 2024-04-29 RX ADMIN — PROPOFOL 30 MG: 10 INJECTION, EMULSION INTRAVENOUS at 10:41

## 2024-04-29 RX ADMIN — Medication 1 UNITS: at 10:33

## 2024-04-29 RX ADMIN — CYCLOPENTOLATE HYDROCHLORIDE 1 DROP: 10 SOLUTION/ DROPS OPHTHALMIC at 07:09

## 2024-04-29 RX ADMIN — PHENYLEPHRINE HYDROCHLORIDE 100 MCG: 10 INJECTION INTRAVENOUS at 09:42

## 2024-04-29 RX ADMIN — PROPOFOL 100 MG: 10 INJECTION, EMULSION INTRAVENOUS at 07:36

## 2024-04-29 RX ADMIN — FENTANYL CITRATE 50 MCG: 50 INJECTION INTRAMUSCULAR; INTRAVENOUS at 07:36

## 2024-04-29 RX ADMIN — Medication 50 MG: at 07:36

## 2024-04-29 RX ADMIN — GADOBUTROL 9 ML: 604.72 INJECTION INTRAVENOUS at 09:37

## 2024-04-29 RX ADMIN — SUGAMMADEX 200 MG: 100 INJECTION, SOLUTION INTRAVENOUS at 12:48

## 2024-04-29 RX ADMIN — PROPOFOL 30 MG: 10 INJECTION, EMULSION INTRAVENOUS at 10:46

## 2024-04-29 RX ADMIN — Medication 50 MG: at 08:24

## 2024-04-29 RX ADMIN — Medication 20 MG: at 11:00

## 2024-04-29 RX ADMIN — IPRATROPIUM BROMIDE AND ALBUTEROL SULFATE 3 ML: .5; 3 SOLUTION RESPIRATORY (INHALATION) at 14:05

## 2024-04-29 RX ADMIN — PROPOFOL 50 MG: 10 INJECTION, EMULSION INTRAVENOUS at 08:14

## 2024-04-29 RX ADMIN — FENTANYL CITRATE 50 MCG: 50 INJECTION INTRAMUSCULAR; INTRAVENOUS at 07:53

## 2024-04-29 RX ADMIN — FENTANYL CITRATE 50 MCG: 50 INJECTION INTRAMUSCULAR; INTRAVENOUS at 09:35

## 2024-04-29 RX ADMIN — Medication 30 MG: at 09:39

## 2024-04-29 RX ADMIN — PHENYLEPHRINE HYDROCHLORIDE 100 MCG: 10 INJECTION INTRAVENOUS at 08:21

## 2024-04-29 RX ADMIN — Medication 1 UNITS: at 10:11

## 2024-04-29 RX ADMIN — PHENYLEPHRINE HYDROCHLORIDE 100 MCG: 10 INJECTION INTRAVENOUS at 08:06

## 2024-04-29 RX ADMIN — DEXAMETHASONE SODIUM PHOSPHATE 4 MG: 4 INJECTION, SOLUTION INTRA-ARTICULAR; INTRALESIONAL; INTRAMUSCULAR; INTRAVENOUS; SOFT TISSUE at 07:45

## 2024-04-29 RX ADMIN — LIDOCAINE HYDROCHLORIDE 100 MG: 20 INJECTION, SOLUTION INFILTRATION; PERINEURAL at 07:36

## 2024-04-29 RX ADMIN — ONDANSETRON 4 MG: 2 INJECTION INTRAMUSCULAR; INTRAVENOUS at 10:46

## 2024-04-29 RX ADMIN — GLYCOPYRROLATE 0.1 MG: 0.2 INJECTION, SOLUTION INTRAMUSCULAR; INTRAVENOUS at 10:03

## 2024-04-29 RX ADMIN — PROPOFOL 50 MG: 10 INJECTION, EMULSION INTRAVENOUS at 07:41

## 2024-04-29 RX ADMIN — GLYCOPYRROLATE 0.2 MG: 0.2 INJECTION, SOLUTION INTRAMUSCULAR; INTRAVENOUS at 07:53

## 2024-04-29 RX ADMIN — PROPOFOL 50 MCG/KG/MIN: 10 INJECTION, EMULSION INTRAVENOUS at 10:37

## 2024-04-29 RX ADMIN — PHENYLEPHRINE HYDROCHLORIDE 1 DROP: 25 SOLUTION/ DROPS OPHTHALMIC at 07:12

## 2024-04-29 RX ADMIN — GLYCOPYRROLATE 0.1 MG: 0.2 INJECTION, SOLUTION INTRAMUSCULAR; INTRAVENOUS at 10:00

## 2024-04-29 ASSESSMENT — ACTIVITIES OF DAILY LIVING (ADL)
ADLS_ACUITY_SCORE: 34
ADLS_ACUITY_SCORE: 31
ADLS_ACUITY_SCORE: 34
ADLS_ACUITY_SCORE: 31

## 2024-04-29 ASSESSMENT — VISUAL ACUITY: OU: BLURRED VISION

## 2024-04-29 NOTE — NURSING NOTE
Benson had Gamma Knife treatment done today under general anesthesia for his left choroidal melanoma.  The frame was placed by Dr. Ramsey in the OR, Gamma Knife treatment completed while patient still under general anesthesia.  Headframe removed without incidence after the Gamma Knife, all pins intact upon removal.  Bacitracin ointment and gauze applied to each of the four pin sites and wrapped with a kerlix head wrap.  Left posterior pin site bled when pin removed but stopped with pressure.  Dr. Landin removed stitches from left eye area and then a pad placed over the left eye.  Discharge instructions were reviewed with Benson's wife and daughter, both voiced understanding.

## 2024-04-29 NOTE — ANESTHESIA CARE TRANSFER NOTE
Patient: Benson Moeller    Procedure: Procedure(s):  LEFT EYE IMMOBILIZATION@8841-2368 IN MAIN OR  GAMMA KNIFE HEAD FRAME PLACEMENT@1527-3945 IN MAIN OR  Anesthesia IN THE MAIN OR MAGNETIC RESONANCE IMAGING@4516-7570,TREATMENT PLANNING@9062-6512  ANESTHESIA OUT OF OR RADIATION ONCOLOGY GAMMA KNIFE TREATMENT/FRAME REMOVAL @1053-1395 AND 1130 PACU       Diagnosis: Melanoma, choroid (H) [C69.30]  Diagnosis Additional Information: No value filed.    Anesthesia Type:   General     Note:    Oropharynx: spontaneously breathing and oropharynx clear of all foreign objects  Level of Consciousness: drowsy  Oxygen Supplementation: face mask  Level of Supplemental Oxygen (L/min / FiO2): 8 LPM  Independent Airway: airway patency satisfactory and stable  Dentition: dentition unchanged  Vital Signs Stable: post-procedure vital signs reviewed and stable  Report to RN Given: handoff report given  Patient transferred to: PACU  Comments: Patient transported to and from radiation oncology accompanied by CRNA & anesthesiologist. Vitals stable throughout transport. Extubated in PACU without incident.  Handoff Report: Identifed the Patient, Identified the Reponsible Provider, Reviewed the pertinent medical history, Discussed the surgical course, Reviewed Intra-OP anesthesia mangement and issues during anesthesia, Set expectations for post-procedure period and Allowed opportunity for questions and acknowledgement of understanding      Vitals:  Vitals Value Taken Time   /84 04/29/24 1315   Temp     Pulse 76 04/29/24 1318   Resp 14 04/29/24 1318   SpO2 96 % 04/29/24 1318   Vitals shown include unfiled device data.    Electronically Signed By: JOEL Gardner CRNA  April 29, 2024  1:19 PM

## 2024-04-29 NOTE — PROCEDURES
Operative Report    Surgeon: Jenna Fonseca MD    Assistant: None    Pre-operative Diagnosis: Choroidal melanoma, left eye     Post-operative Diagnosis: Choroidal melanoma, left eye    Procedure:  1) Exam under anesthesia, left eye  2) Gamma knife radiation therapy, left eye    Complications: None    Blood loss: Minimal    Specimens: None    Anesthesia: General Anesthesia with retrobulbar block    Description of Procedure:    Patient was met in the pre-operative area where all questions/concerns from the patient were answered. Informed consent was signed. The correct surgical eye was marked.     Patient was then brought to the operating room where a formal time out was performed, confirming the correct procedure and eye. The patient was induced under general anesthesia without complication. Retrobulbar block consisting of 4.5 ml of 0.75% Marcaine and 4.5 ml of 2% lidocaine and 150 units of Hylenex was administered without complication. The surgical eye was examined and confirmed to not have any extraocular extension.    The eye was prepped and draped in a semi-sterile fashion. Lid speculum was placed in the eye. Betadine was placed on the ocular surface. The lateral and medical rectus muscles were identified and tagged with a 0-Silk 28 mm taperpoint needle. Care was taken to not violate sclera.  Betadine was rinsed from the surface. Erythomycin ointment was placed on the surface of the eye.     Neurosurgery team then placed and fixated the radiation head frame. The silk sutures were then tied to the frame to ensure proper eye fixation.     The eye was then covered with a Tegaderm patch to keep the eye closed.     The patient was then turned over to the Radiation Oncology and Neurosurgery team for gamma knife radiation therapy.

## 2024-04-29 NOTE — DISCHARGE INSTRUCTIONS
Contacting your Doctor -   To contact a doctor, call [phone] or:  [phone] and ask for the resident on call for Ophthalmology (answered 24 hours a day)   Emergency Department:  [facility]: [phone]  [facility]: [phone] 911 if you are in need of immediate or emergent help

## 2024-04-29 NOTE — LETTER
4/29/2024         RE: Benson Moeller  [address] [facility] [address]        Dear Colleague,    Thank you for referring your patient, Benson Moeller, to the [facility] RADIATION ONCOLOGY GAMMA KNIFE. Please see a copy of my visit note below.    Neurosurgery Gamma Knife Note    Reason for Visit: Gamma Knife Planning and Treatment    History of Present Illness  Mr. Moeller is an 83 year old gentleman who presents with a diagnosis of  choroidal melanoma of the LEFT eye. Originally he was found to have bilateral lesions but the left one was more suspicious for melanoma with slow growth as observed by Dr. Fonseca. GK SRS was recommended to due its position. He saw Dr. Landin who recommended gamma knife radiation with eye immobilization under anesthesia for treatment.        Past Medical History:   Diagnosis Date     Arthritis      Choroidal malignant melanoma, left (H)      Diabetes mellitus, type 2 (H)      HTN (hypertension)      Hypercholesteremia        Past Surgical History:   Procedure Laterality Date     ANESTHESIA OUT OF OR MRI Left 4/29/2024    Procedure: Anesthesia IN THE MAIN OR MAGNETIC RESONANCE IMAGING@5191-1574,TREATMENT PLANNING@4906-1686;  Surgeon: GENERIC ANESTHESIA PROVIDER;  Location: [facility] OR     COLONOSCOPY      x 4     EXAM UNDER ANESTHESIA EYE(S) Left 4/29/2024    Procedure: LEFT EYE IMMOBILIZATION@2500-2232 IN MAIN OR;  Surgeon: Jenna Fonseca MD;  Location: [facility] OR     PLACEMENT, HEAD FRAME N/A 4/29/2024    Procedure: GAMMA KNIFE HEAD FRAME PLACEMENT@5204-9920 IN MAIN OR;  Surgeon: Antonio Ramsey MD;  Location: [facility] OR       Allergies   Allergen Reactions     Glimepiride Rash     03- 08:03:43 - verified       Current Outpatient Medications   Medication Sig Dispense Refill     aspirin 81 MG EC tablet Take 1 tablet by mouth every morning       atorvastatin (LIPITOR) 20 MG tablet Take 10 mg by mouth every evening       cyanocobalamin (VITAMIN B-12) 1000 MCG tablet Take 1,000  mcg by mouth every morning       lisinopril (ZESTRIL) 2.5 MG tablet Take 2.5 mg by mouth every evening       metFORMIN (GLUCOPHAGE) 1000 MG tablet Take 1,000 mg by mouth 2 times daily (with meals)       neomycin-polymyxin-dexAMETHasone (MAXITROL) 3.5-36561-9.1 SUSP ophthalmic susp SHAKE LIQUID AND INSTILL 1 DROP IN LEFT EYE FOUR TIMES DAILY AS DIRECTED. START AFTER PROCEDURE. USE UNTIL GONE       No current facility-administered medications for this visit.     Facility-Administered Medications Ordered in Other Visits   Medication Dose Route Frequency Provider Last Rate Last Admin     BUPivacaine 0.375%, lidocaine 1%, EPINEPHrine 1:200,000 injection  20 mL Intradermal Pre-Op/Pre-procedure x 1 dose Bertha Landin MD                 Physical Exam  There were no vitals taken for this visit.      General: Awake, alert, oriented. Well nourished, well developed, no acute distress.  HEENT: Head normocephalic, atraumatic.  Neurological  Awake, alert and oriented to date, time, place and person. Speech fluent.   Face symmetric.  Tongue midline.  Uvula elevates equally.    Motor: full strength throughout.  Sensation: intact to light touch    ROS: 10 point ROS neg other than the symptoms noted above in the HPI.      Assessment and Plan   Benson Moeller is a 83 year old male with Left choroidal melanoma who presents for gamma knife under anesthesia with immobilization of his eye and placement of a frame. Today we went through the informed consent process to discuss the alternatives, potential benefits, and risks of gamma knife radiosurgery.  In particular we discussed the risks of pin site pain, pin site infection, cognitive dysfunction, brain swelling, radiation necrosis, headache, nausea and vomiting, numbness/tingling of the pin sites, hair loss, seizures, and rarely bleeding into the brain. They asked excellent questions, which were answered. They would like to move forward with gamma knife treatment.      Gamma Knife  treatment     Antonio Ramsey MD  Department of Neurosurgery  [facility]

## 2024-04-29 NOTE — ANESTHESIA PROCEDURE NOTES
Airway       Patient location during procedure: OR       Procedure Start/Stop Times: 4/29/2024 7:39 AM  Staff -        CRNA: Lynn Cerrato APRN CRNA       Performed By: CRNA  Consent for Airway        Urgency: elective  Indications and Patient Condition       Indications for airway management: cassidy-procedural         Mask difficulty assessment: 1 - vent by mask    Final Airway Details       Final airway type: endotracheal airway       Successful airway: ETT - single and Oral  Endotracheal Airway Details        ETT size (mm): 7.5       Cuffed: yes       Cuff volume (mL): 9       Successful intubation technique: direct laryngoscopy       DL Blade Type: Oliva 2       Grade View of Cords: 2 (2b)       Adjucts: stylet       Position: Right       Measured from: lips       Secured at (cm): 22       Bite block used: None    Post intubation assessment        Placement verified by: capnometry, equal breath sounds and chest rise        Number of attempts at approach: 1       Number of other approaches attempted: 1 (cricoid pressure)       Secured with: tape       Ease of procedure: easy       Dentition: Intact and Unchanged    Medication(s) Administered   Medication Administration Time: 4/29/2024 7:39 AM    Additional Comments       Anterior glottis

## 2024-04-29 NOTE — LETTER
2024         RE: Benson Moeller  [address]  [facility] [address]        Dear Colleague,    Thank you for referring your patient, Benson Moeller, to the [facility] RADIATION ONCOLOGY GAMMA KNIFE. Please see a copy of my visit note below.      Name: Benson Moeller.  : 1940  Medical Record #:[id number]    Diagnosis: C69.32 Malignant neoplasm of left choroid  Date of Treatment: 2024      GAMMA KNIFE PROCEDURE NOTE and TREATMENT SUMMARY    Treatment Summary:     Treatment Site Dose Modality Collimators Shots   1A LT Choroid 24 Gy to 65% isodose Cobalt 60 4,8mm 16             DESCRIPTION OF PROCEDURE:  On 2024, Benson was induced under general anesthesia in the operating room.     The stereotactic head frame was placed by Dr. Ramsey.    A retrobulbar block was performed by Marian  He also sutured the medial and lateral rectus muscles to the head frame in order to completely immobilize the eye. The eye was taped shut with Tegaderm.    Lakshmi underwent a stealth MRI. These images were then sent to the [employer] Gamma Knife computer system where the [employer] Gamma Knife IconTM Plan software was used to create a highly conformal dose distribution using the number and size collimators detailed above.     She was brought to the Gamma Knife suite at the [facility].      The patient was then treated on the Icon [place] Gamma Knife. Treatment involved 1 targets      TREATMENT:    The patient was brought to the Gamma Knife suite. The patient was identified by 2 methods. A timeout was performed to confirm the correct patient and correct procedure. The site was identified by an MRI image and treatment planning software, which defined the treatment area.   We evaluated the frame manually to ensure it was still secure.     The treatment was delivered using the [place] Gamma Knife IconTM without complication. The sutures were clipped. The head frame was  removed.      The patient was taken back to the recovery room where she was reversed from anesthesia.    The patient recovered and was discharged home in stable condition.    The patient tolerated the treatment well and had no complications.    FOLLOW-UP PLANS:  The Gamma Knife Nurse Coordinator will call the patient tomorrow for short-term follow up.  Written discharge instructions were given to the patient.     The patient will have a follow up imaging with Dr Fonseca    Approved by:  Bertha Landin MD    Again, thank you for allowing me to participate in the care of your patient.        Sincerely,        Bertha Landin MD

## 2024-04-29 NOTE — ANESTHESIA POSTPROCEDURE EVALUATION
Patient: Benson Moeller    Procedure: Procedure(s):  LEFT EYE IMMOBILIZATION@8511-6410 IN MAIN OR  GAMMA KNIFE HEAD FRAME PLACEMENT@1131-4850 IN MAIN OR  Anesthesia IN THE MAIN OR MAGNETIC RESONANCE IMAGING@2791-9798,TREATMENT PLANNING@6805-0613  ANESTHESIA OUT OF OR RADIATION ONCOLOGY GAMMA KNIFE TREATMENT/FRAME REMOVAL @4309-2995 AND 1130 PACU       Anesthesia Type:  General    Note:  Disposition: Outpatient   Postop Pain Control: Uneventful            Sign Out: Well controlled pain   PONV: No   Neuro/Psych: Uneventful            Sign Out: Acceptable/Baseline neuro status   Airway/Respiratory: Uneventful            Sign Out: Acceptable/Baseline resp. status   CV/Hemodynamics: Uneventful            Sign Out: Acceptable CV status; No obvious hypovolemia; No obvious fluid overload   Other NRE: NONE   DID A NON-ROUTINE EVENT OCCUR? No           Last vitals:  Vitals Value Taken Time   /84 04/29/24 1315   Temp     Pulse 77 04/29/24 1316   Resp 16 04/29/24 1316   SpO2 98 % 04/29/24 1316   Vitals shown include unfiled device data.    Electronically Signed By: Bessie Saunders MD  April 29, 2024  1:17 PM

## 2024-04-30 ENCOUNTER — TELEPHONE (OUTPATIENT)
Dept: RADIATION ONCOLOGY | Facility: CLINIC | Age: 84
End: 2024-04-30
Payer: MEDICARE

## 2024-04-30 NOTE — TELEPHONE ENCOUNTER
A call was placed to Benson in follow up to his Gamma Knife treatment on 4/29/24 for his left choroidal melanoma.  I spoke to Vandana, Benson's wife, who stated Benson was outside for a walk.  Vandana said Benson denied any pain in his eye or headache, he was eating with no nausea.  The pin sites were not bothering and everything seemed to be going just fine.  No complaints

## 2024-05-13 NOTE — PROGRESS NOTES
Neurosurgery Gamma Knife Note    Reason for Visit: Gamma Knife Planning and Treatment    History of Present Illness  Mr. Moeller is an 83 year old gentleman who presents with a diagnosis of  choroidal melanoma of the LEFT eye. Originally he was found to have bilateral lesions but the left one was more suspicious for melanoma with slow growth as observed by Dr. Fonseca. GK SRS was recommended to due its position. He saw Dr. Landin who recommended gamma knife radiation with eye immobilization under anesthesia for treatment.        Past Medical History:   Diagnosis Date    Arthritis     Choroidal malignant melanoma, left (H)     Diabetes mellitus, type 2 (H)     HTN (hypertension)     Hypercholesteremia        Past Surgical History:   Procedure Laterality Date    ANESTHESIA OUT OF OR MRI Left 4/29/2024    Procedure: Anesthesia IN THE MAIN OR MAGNETIC RESONANCE IMAGING@3935-6785,TREATMENT PLANNING@5022-5481;  Surgeon: GENERIC ANESTHESIA PROVIDER;  Location: [facility] OR    COLONOSCOPY      x 4    EXAM UNDER ANESTHESIA EYE(S) Left 4/29/2024    Procedure: LEFT EYE IMMOBILIZATION@1892-7100 IN MAIN OR;  Surgeon: Jenna Fonseca MD;  Location: [facility] OR    PLACEMENT, HEAD FRAME N/A 4/29/2024    Procedure: GAMMA KNIFE HEAD FRAME PLACEMENT@1389-3112 IN MAIN OR;  Surgeon: Antonio Ramsey MD;  Location: [facility] OR       Allergies   Allergen Reactions    Glimepiride Rash     03- 08:03:43 - verified       Current Outpatient Medications   Medication Sig Dispense Refill    aspirin 81 MG EC tablet Take 1 tablet by mouth every morning      atorvastatin (LIPITOR) 20 MG tablet Take 10 mg by mouth every evening      cyanocobalamin (VITAMIN B-12) 1000 MCG tablet Take 1,000 mcg by mouth every morning      lisinopril (ZESTRIL) 2.5 MG tablet Take 2.5 mg by mouth every evening      metFORMIN (GLUCOPHAGE) 1000 MG tablet Take 1,000 mg by mouth 2 times daily (with meals)      neomycin-polymyxin-dexAMETHasone (MAXITROL) 3.5-26497-2.1 SUSP ophthalmic susp  SHAKE LIQUID AND INSTILL 1 DROP IN LEFT EYE FOUR TIMES DAILY AS DIRECTED. START AFTER PROCEDURE. USE UNTIL GONE       No current facility-administered medications for this visit.     Facility-Administered Medications Ordered in Other Visits   Medication Dose Route Frequency Provider Last Rate Last Admin    BUPivacaine 0.375%, lidocaine 1%, EPINEPHrine 1:200,000 injection  20 mL Intradermal Pre-Op/Pre-procedure x 1 dose Bertha Landin MD                 Physical Exam  There were no vitals taken for this visit.      General: Awake, alert, oriented. Well nourished, well developed, no acute distress.  HEENT: Head normocephalic, atraumatic.  Neurological  Awake, alert and oriented to date, time, place and person. Speech fluent.   Face symmetric.  Tongue midline.  Uvula elevates equally.    Motor: full strength throughout.  Sensation: intact to light touch    ROS: 10 point ROS neg other than the symptoms noted above in the HPI.      Assessment and Plan   Benson Moeller is a 83 year old male with Left choroidal melanoma who presents for gamma knife under anesthesia with immobilization of his eye and placement of a frame. Today we went through the informed consent process to discuss the alternatives, potential benefits, and risks of gamma knife radiosurgery.  In particular we discussed the risks of pin site pain, pin site infection, cognitive dysfunction, brain swelling, radiation necrosis, headache, nausea and vomiting, numbness/tingling of the pin sites, hair loss, seizures, and rarely bleeding into the brain. They asked excellent questions, which were answered. They would like to move forward with gamma knife treatment.      Gamma Knife treatment     Antonio Ramsey MD  Department of Neurosurgery  [facility]

## 2024-05-13 NOTE — PROCEDURES
PATIENT NAME:   DATE OF BIRTH:   MRN:        DATE OF PROCEDURE:  04/29/2024    PREOPERATIVE DIAGNOSIS:     1. choroidal melanoma    POSTOPERATIVE DIAGNOSIS:     1. choroidal melanoma    PROCEDURES PERFORMED:    1.  Placement of [employer]ell stereotactic head frame.  2.  Stereotactic MRI of the brain and face  3. Generation of stereotactic treatment plan.    4.  Gamma knife treatment of complex lesion in the Left eye, single fractionation.    ATTENDING SURGEON:  Antonio Ramsey MD    ASSISTANT: None    RADIATION ONCOLOGIST:   Bertha Landin MD     RADIATION PHYSICIST:  Amira Anderson MD    ASSISTANT:  No resident participated in this procedure.       INDICATIONS FOR PROCEDURE:    History Of Present Illness: Mr. Moeller is an 83 year old gentleman who presents with a diagnosis of  choroidal melanoma of the LEFT eye. Originally he was found to have bilateral lesions but the left one was more suspicious for melanoma with slow growth as observed by Dr. Fonseca. GK SRS was recommended to due its position. He saw Dr. Landin who recommended gamma knife radiation with eye immobilization under anesthesia for treatment. The risks, benefits and alternative therapies were discussed with the patient.  All questions were answered.    DESCRIPTION OF PROCEDURE: After obtaining informed consent, the patient was brought to the OR and correctly identified. He underwent general anesthesia with endotracheal intubation without issue. A time out was performed confirming the patient s identity, procedure to be performed and the site and side of the procedure.  Dr. Fonseca proceeded to place sutures around the eye. Subsequently, the [employer] stereotactic head frame was secured to the patient's head using 4 pins.  Intended pin sites were prepared in the usual sterile fashion and infiltrated with local anesthetic.  Care was taken not to over tighten the pins and therefore avoid torquing the frame.  Accurate and secure placement of the frame  was verified.  Skull geometry measurements were tested with cone and a fiducial localizer box affixed to the head frame.    Dr. Fonseca immobilized his eye using sutures and attached them to the frame. The patient was then transferred to the MRI scanner and a stereotactic MRI of the brain with contrast was obtained.  MRI images were transferred to the gamma knife treatment planning station and 3-dimensional stereotactic treatment plan was generated.     Target 1A was named Left Choroidal.  The dimension of the targeted lesion was 0.696 cm3. We created a treatment plan that incorporated 100% of the lesion with the 65% isodose line set to 24 Gy with 4/8mm collimators using 16 shots.     We spent extra time segmenting out the optic nerve and minimizing radiation to the optic nerve.     The patient was brought to the Gamma Knife unit and laid down supine on the table where the frame was attached.  A cone beam CT was done and fused to the previously approved plan. The fusion was evaluated and target coverage was confirmed to be good after auto-registration. The treatment was delivered using ICON Gamma Knife.    At the completion of the treatment, the sutures were removed, he was extubated, and the frame was removed. The patient was given discharge instructions for home and a plan for follow-up.  No immediate complications were noted.      Antonio LI MD, Neurosurgery Attending, was present for the key neurosurgical portions of the procedure and immediately available throughout the entire procedure.    Antonio Ramsey MD

## 2024-05-15 NOTE — PROGRESS NOTES
Name: Benson Moeller  : 1940  Medical Record #:[id number]    Diagnosis: C69.32 Malignant neoplasm of left choroid  Date of Treatment: 2024      GAMMA KNIFE PROCEDURE NOTE and TREATMENT SUMMARY    Treatment Summary:     Treatment Site Dose Modality Collimators Shots   1A LT Choroid 24 Gy to 65% isodose Cobalt 60 4,8mm 16             DESCRIPTION OF PROCEDURE:  On 2024, Benson was induced under general anesthesia in the operating room.     The stereotactic head frame was placed by Dr. Ramsey.    A retrobulbar block was performed by Marian  He also sutured the medial and lateral rectus muscles to the head frame in order to completely immobilize the eye. The eye was taped shut with Tegaderm.    Lakshmi underwent a stealth MRI. These images were then sent to the [employer] Gamma Knife computer system where the [place] Gamma Knife IconTM Plan software was used to create a highly conformal dose distribution using the number and size collimators detailed above.     She was brought to the Gamma Knife suite at the [facility].      The patient was then treated on the Icon [employer] Gamma Knife. Treatment involved 1 targets      TREATMENT:    The patient was brought to the Gamma Knife suite. The patient was identified by 2 methods. A timeout was performed to confirm the correct patient and correct procedure. The site was identified by an MRI image and treatment planning software, which defined the treatment area.   We evaluated the frame manually to ensure it was still secure.     The treatment was delivered using the [employer] Gamma Knife IconTM without complication. The sutures were clipped. The head frame was removed.      The patient was taken back to the recovery room where she was reversed from anesthesia.    The patient recovered and was discharged home in stable condition.    The patient tolerated the treatment well and had no complications.    FOLLOW-UP PLANS:  The  Gamma Knife Nurse Coordinator will call the patient tomorrow for short-term follow up.  Written discharge instructions were given to the patient.     The patient will have a follow up imaging with Dr Fonseca    Approved by:  Bertha Landin MD

## 2024-07-19 ENCOUNTER — ANCILLARY PROCEDURE (OUTPATIENT)
Dept: MRI IMAGING | Facility: CLINIC | Age: 84
End: 2024-07-19
Attending: STUDENT IN AN ORGANIZED HEALTH CARE EDUCATION/TRAINING PROGRAM
Payer: MEDICARE

## 2024-07-19 ENCOUNTER — APPOINTMENT (OUTPATIENT)
Dept: LAB | Facility: CLINIC | Age: 84
End: 2024-07-19
Attending: STUDENT IN AN ORGANIZED HEALTH CARE EDUCATION/TRAINING PROGRAM
Payer: MEDICARE

## 2024-07-19 ENCOUNTER — ANCILLARY PROCEDURE (OUTPATIENT)
Dept: CT IMAGING | Facility: CLINIC | Age: 84
End: 2024-07-19
Attending: STUDENT IN AN ORGANIZED HEALTH CARE EDUCATION/TRAINING PROGRAM
Payer: MEDICARE

## 2024-07-19 ENCOUNTER — ONCOLOGY VISIT (OUTPATIENT)
Dept: ONCOLOGY | Facility: CLINIC | Age: 84
End: 2024-07-19
Attending: STUDENT IN AN ORGANIZED HEALTH CARE EDUCATION/TRAINING PROGRAM
Payer: MEDICARE

## 2024-07-19 VITALS
TEMPERATURE: 98 F | HEART RATE: 66 BPM | OXYGEN SATURATION: 97 % | RESPIRATION RATE: 16 BRPM | SYSTOLIC BLOOD PRESSURE: 156 MMHG | WEIGHT: 198 LBS | DIASTOLIC BLOOD PRESSURE: 81 MMHG | BODY MASS INDEX: 29.24 KG/M2

## 2024-07-19 DIAGNOSIS — C69.32 CHOROIDAL MALIGNANT MELANOMA, LEFT (H): ICD-10-CM

## 2024-07-19 LAB
ALBUMIN SERPL BCG-MCNC: 4 G/DL (ref 3.5–5.2)
ALP SERPL-CCNC: 81 U/L (ref 40–150)
ALT SERPL W P-5'-P-CCNC: 16 U/L (ref 0–70)
ANION GAP SERPL CALCULATED.3IONS-SCNC: 9 MMOL/L (ref 7–15)
AST SERPL W P-5'-P-CCNC: 14 U/L (ref 0–45)
BASOPHILS # BLD AUTO: 0 10E3/UL (ref 0–0.2)
BASOPHILS NFR BLD AUTO: 1 %
BILIRUB SERPL-MCNC: 0.6 MG/DL
BUN SERPL-MCNC: 10.7 MG/DL (ref 8–23)
CALCIUM SERPL-MCNC: 9 MG/DL (ref 8.8–10.4)
CHLORIDE SERPL-SCNC: 102 MMOL/L (ref 98–107)
CREAT SERPL-MCNC: 1 MG/DL (ref 0.67–1.17)
EGFRCR SERPLBLD CKD-EPI 2021: 75 ML/MIN/1.73M2
EOSINOPHIL # BLD AUTO: 0.1 10E3/UL (ref 0–0.7)
EOSINOPHIL NFR BLD AUTO: 2 %
ERYTHROCYTE [DISTWIDTH] IN BLOOD BY AUTOMATED COUNT: 11.9 % (ref 10–15)
GLUCOSE SERPL-MCNC: 242 MG/DL (ref 70–99)
HCO3 SERPL-SCNC: 24 MMOL/L (ref 22–29)
HCT VFR BLD AUTO: 40.2 % (ref 40–53)
HGB BLD-MCNC: 13.7 G/DL (ref 13.3–17.7)
IMM GRANULOCYTES # BLD: 0 10E3/UL
IMM GRANULOCYTES NFR BLD: 0 %
LYMPHOCYTES # BLD AUTO: 1.3 10E3/UL (ref 0.8–5.3)
LYMPHOCYTES NFR BLD AUTO: 19 %
MCH RBC QN AUTO: 32.5 PG (ref 26.5–33)
MCHC RBC AUTO-ENTMCNC: 34.1 G/DL (ref 31.5–36.5)
MCV RBC AUTO: 95 FL (ref 78–100)
MONOCYTES # BLD AUTO: 0.5 10E3/UL (ref 0–1.3)
MONOCYTES NFR BLD AUTO: 7 %
NEUTROPHILS # BLD AUTO: 5.1 10E3/UL (ref 1.6–8.3)
NEUTROPHILS NFR BLD AUTO: 71 %
NRBC # BLD AUTO: 0 10E3/UL
NRBC BLD AUTO-RTO: 0 /100
PLATELET # BLD AUTO: 156 10E3/UL (ref 150–450)
POTASSIUM SERPL-SCNC: 4.3 MMOL/L (ref 3.4–5.3)
PROT SERPL-MCNC: 6.6 G/DL (ref 6.4–8.3)
RBC # BLD AUTO: 4.22 10E6/UL (ref 4.4–5.9)
SODIUM SERPL-SCNC: 135 MMOL/L (ref 135–145)
WBC # BLD AUTO: 7 10E3/UL (ref 4–11)

## 2024-07-19 PROCEDURE — G0463 HOSPITAL OUTPT CLINIC VISIT: HCPCS | Performed by: STUDENT IN AN ORGANIZED HEALTH CARE EDUCATION/TRAINING PROGRAM

## 2024-07-19 PROCEDURE — G1010 CDSM STANSON: HCPCS | Performed by: STUDENT IN AN ORGANIZED HEALTH CARE EDUCATION/TRAINING PROGRAM

## 2024-07-19 PROCEDURE — A9581 GADOXETATE DISODIUM INJ: HCPCS | Performed by: STUDENT IN AN ORGANIZED HEALTH CARE EDUCATION/TRAINING PROGRAM

## 2024-07-19 PROCEDURE — 71260 CT THORAX DX C+: CPT | Mod: MG | Performed by: RADIOLOGY

## 2024-07-19 PROCEDURE — 82040 ASSAY OF SERUM ALBUMIN: CPT | Performed by: STUDENT IN AN ORGANIZED HEALTH CARE EDUCATION/TRAINING PROGRAM

## 2024-07-19 PROCEDURE — 36415 COLL VENOUS BLD VENIPUNCTURE: CPT | Performed by: STUDENT IN AN ORGANIZED HEALTH CARE EDUCATION/TRAINING PROGRAM

## 2024-07-19 PROCEDURE — 99214 OFFICE O/P EST MOD 30 MIN: CPT | Performed by: STUDENT IN AN ORGANIZED HEALTH CARE EDUCATION/TRAINING PROGRAM

## 2024-07-19 PROCEDURE — 74183 MRI ABD W/O CNTR FLWD CNTR: CPT | Mod: MG | Performed by: STUDENT IN AN ORGANIZED HEALTH CARE EDUCATION/TRAINING PROGRAM

## 2024-07-19 PROCEDURE — 36592 COLLECT BLOOD FROM PICC: CPT | Performed by: STUDENT IN AN ORGANIZED HEALTH CARE EDUCATION/TRAINING PROGRAM

## 2024-07-19 PROCEDURE — G2211 COMPLEX E/M VISIT ADD ON: HCPCS | Performed by: STUDENT IN AN ORGANIZED HEALTH CARE EDUCATION/TRAINING PROGRAM

## 2024-07-19 PROCEDURE — 85025 COMPLETE CBC W/AUTO DIFF WBC: CPT | Performed by: STUDENT IN AN ORGANIZED HEALTH CARE EDUCATION/TRAINING PROGRAM

## 2024-07-19 PROCEDURE — 84450 TRANSFERASE (AST) (SGOT): CPT | Performed by: STUDENT IN AN ORGANIZED HEALTH CARE EDUCATION/TRAINING PROGRAM

## 2024-07-19 PROCEDURE — G1010 CDSM STANSON: HCPCS | Performed by: RADIOLOGY

## 2024-07-19 RX ORDER — IOPAMIDOL 755 MG/ML
96 INJECTION, SOLUTION INTRAVASCULAR ONCE
Status: COMPLETED | OUTPATIENT
Start: 2024-07-19 | End: 2024-07-19

## 2024-07-19 RX ADMIN — IOPAMIDOL 96 ML: 755 INJECTION, SOLUTION INTRAVASCULAR at 09:27

## 2024-07-19 ASSESSMENT — PAIN SCALES - GENERAL: PAINLEVEL: NO PAIN (0)

## 2024-07-19 NOTE — LETTER
7/19/2024      Benson Moeller  [address] [facility] [address]      Dear Colleague,    Thank you for referring your patient, Benson Moeller, to the [facility]. Please see a copy of my visit note below.    [facility]   Return Patient Visit    Name: Benson Moeller  MRN: [id number]  Date of visit: Jul 19, 2024    Diagnosis: Choroidal melanoma of the left eye  Stage:    Cancer Staging   Choroidal malignant melanoma, left (H)  Staging form: Uvea - Ciliary Body and Choroid, AJCC 8th Edition  - Clinical: Stage IIB (cT3a, cN0, cM0) - Signed by Forest Pena MD on 4/5/2024      Molecular: TBD  Performance status: ECOG 0-1    Oncology History:   -9/2023, bilateral choroidal lesions, both considered high risk for malignant melanoma, overall stabel since 5/2023 (Dr. Corrigan, [place]). Elected for observation.  -9/11/23 CT chest: Stable 4 mm nodule in the left lower lobe. No evidence of new or progressive metastasis in the chest.   -9/12/23 MR liver: 1.  6 mm osseous lesion in L2 and enhancing lesion in T8 correlate with lytic lesions seen on recent CT suspicious for metastasis. 2.  Stable tiny enhancing left adrenal nodules.   -2/29/24 seen by Dr. Fonseca, lesion overall stable on exam, imaging shows left eye 2.97 (h) x 16.82 mm (l) x 12.15 mm (w); right eye: 2.02 mm (h) x 7.43 mm (l) x 6.87 mm (w)  -4/4/24 PET/CT: No evidence of hypermetabolic distant metastasis. Few scattered sub-4 mm solid pulmonary nodules  -4/4/24 MR liver: No evidence of metastatic disease.       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     Left eye lesion was first noted during a routine eye exam about a year ago, noted to growing over time. R eye lesion, although smaller, is involving the macula. Had been followed with Dr. Corrigan at [place],  now following with Dr. Fonseca ([facility]) and referred to medical oncology for surveillance planning.     Overall feels well, no acute complaints today. Of note, his MRI of the liver in September showed lytic lesions at T8, however, these were ultimately deemed to be likely benign, and subsequent imaging has not demonstrated concern for metastatic disease.     His oncology history is outlined above.     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 Continued surveillance suggested.    7/19/24 MR liver: No evidence of metastatic disease in the abdomen.     L eye vision is nearly lost entirely  R eye vision is poor  Has had tearing in the L eye ever since GK, not getting worse  Generally feeling well  No new symptoms to report today      Exam:   BP (!) 156/81   Pulse 66   Temp 98  F (36.7  C) (Oral)   Resp 16   Wt 89.8 kg (198 lb)   SpO2 97%   BMI 29.24 kg/m      Gen: Alert, well appearing, NAD  HEENT: Pupils equal, non-icteric  Resp: Comfortable on room air  CV: Well perfused  Abd: Non-distended  Neuro: Normal gait      Labs:   Reviewed in chart. 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       # L uveal melanoma  # R suspicious choroidal nevus  -previously followed  with Dr. Corrigan at [place], now established with Dr. Fonseca  -L sided lesion is most concerning for malignant choroidal melanoma  -by size criteria, his primary lesion would be staged as T3a, which portends intermediate risk of recurrence (stage IIB by AJCC 8)  -Now status post gamma knife to the left lesion on 4/29/24  -Imaging findings reviewed, no concern for metastatic disease in the chest or abdomen      Plan:  --Given his indeterminate risk of recurrence (by size criteria), and after discussion of risks and benefits of screening, plan for repeat CT chest and MR liver every 6 months for now  --Follow-up and labs at that time  --continue follow up with Dr. Fonseca for management of his local tumor and side effects of radiation        Forest Pena MD PhD   of Medicine  Division of Hematology, Oncology and Transplantation    ---  30 minutes were spent on the date of the encounter performing chart review, history and exam, documentation, and further activities as noted above.     The longitudinal plan of care for the diagnosis(es)/condition(s) as documented were addressed during this visit. Due to the added complexity in care, I will continue to support Benson in the subsequent management and with ongoing continuity of care.          Again, thank you for allowing me to participate in the care of your patient.        Sincerely,        Forest Pena MD

## 2024-07-19 NOTE — NURSING NOTE
"Oncology Rooming Note    July 19, 2024 2:16 PM   Benson Moeller is a 83 year old male who presents for:    Chief Complaint   Patient presents with    Blood Draw     IV placement with blood draw by lab RN    Oncology Clinic Visit     RTN PATRIAL     Initial Vitals: BP (!) 156/81   Pulse 66   Temp 98  F (36.7  C) (Oral)   Resp 16   Wt 89.8 kg (198 lb)   SpO2 97%   BMI 29.24 kg/m   Estimated body mass index is 29.24 kg/m  as calculated from the following:    Height as of 4/29/24: 1.753 m (5' 9\").    Weight as of this encounter: 89.8 kg (198 lb). Body surface area is 2.09 meters squared.  No Pain (0) Comment: Data Unavailable   No LMP for male patient.  Allergies reviewed: Yes  Medications reviewed: Yes    Medications: Medication refills not needed today.  Pharmacy name entered into EPIC:    [facility] - [place], [place] - [address] [place] [address] [id number] - [address] [facility] [address]    Frailty Screening:   Is the patient here for a new oncology consult visit in cancer care? 2. No      Clinical concerns: would like to know if can do the 3 months scans at [place]             "

## 2024-07-19 NOTE — PROGRESS NOTES
[facility]   Return Patient Visit    Name: Benson Moeller  MRN: [id number]  Date of visit: Jul 19, 2024    Diagnosis: Choroidal melanoma of the left eye  Stage:    Cancer Staging   Choroidal malignant melanoma, left (H)  Staging form: Uvea - Ciliary Body and Choroid, AJCC 8th Edition  - Clinical: Stage IIB (cT3a, cN0, cM0) - Signed by Forest Pena MD on 4/5/2024      Molecular: TBD  Performance status: ECOG 0-1    Oncology History:   -9/2023, bilateral choroidal lesions, both considered high risk for malignant melanoma, overall stabel since 5/2023 (Dr. Corrigan, [place]). Elected for observation.  -9/11/23 CT chest: Stable 4 mm nodule in the left lower lobe. No evidence of new or progressive metastasis in the chest.   -9/12/23 MR liver: 1.  6 mm osseous lesion in L2 and enhancing lesion in T8 correlate with lytic lesions seen on recent CT suspicious for metastasis. 2.  Stable tiny enhancing left adrenal nodules.   -2/29/24 seen by Dr. Fonseca, lesion overall stable on exam, imaging shows left eye 2.97 (h) x 16.82 mm (l) x 12.15 mm (w); right eye: 2.02 mm (h) x 7.43 mm (l) x 6.87 mm (w)  -4/4/24 PET/CT: No evidence of hypermetabolic distant metastasis. Few scattered sub-4 mm solid pulmonary nodules  -4/4/24 MR liver: No evidence of metastatic disease.       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     Left eye lesion was first noted during a routine eye exam about a year ago, noted to growing over time. R eye lesion, although smaller, is involving the macula. Had been followed with Dr. Corrigan at [place], now following with Dr. Fonseca ([facility]) and referred to medical oncology for surveillance planning.     Overall feels well, no acute complaints today. Of note, his MRI of the liver in September showed lytic lesions at T8,  however, these were ultimately deemed to be likely benign, and subsequent imaging has not demonstrated concern for metastatic disease.     His oncology history is outlined above.     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 Continued surveillance suggested.    7/19/24 MR liver: No evidence of metastatic disease in the abdomen.     L eye vision is nearly lost entirely  R eye vision is poor  Has had tearing in the L eye ever since GK, not getting worse  Generally feeling well  No new symptoms to report today      Exam:   BP (!) 156/81   Pulse 66   Temp 98  F (36.7  C) (Oral)   Resp 16   Wt 89.8 kg (198 lb)   SpO2 97%   BMI 29.24 kg/m      Gen: Alert, well appearing, NAD  HEENT: Pupils equal, non-icteric  Resp: Comfortable on room air  CV: Well perfused  Abd: Non-distended  Neuro: Normal gait      Labs:   Reviewed in chart. 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       # L uveal melanoma  # R suspicious choroidal nevus  -previously followed with Dr. Corrigan at [place], now established with Dr. Fonseca  -L sided lesion is most concerning for malignant choroidal melanoma  -by size criteria, his primary lesion would be staged as T3a, which portends intermediate risk of recurrence  (stage IIB by AJCC 8)  -Now status post gamma knife to the left lesion on 4/29/24  -Imaging findings reviewed, no concern for metastatic disease in the chest or abdomen      Plan:  --Given his indeterminate risk of recurrence (by size criteria), and after discussion of risks and benefits of screening, plan for repeat CT chest and MR liver every 6 months for now  --Follow-up and labs at that time  --continue follow up with Dr. Fonseca for management of his local tumor and side effects of radiation        Forest Pena MD PhD   of Medicine  Division of Hematology, Oncology and Transplantation    ---  30 minutes were spent on the date of the encounter performing chart review, history and exam, documentation, and further activities as noted above.     The longitudinal plan of care for the diagnosis(es)/condition(s) as documented were addressed during this visit. Due to the added complexity in care, I will continue to support Benson in the subsequent management and with ongoing continuity of care.

## 2024-07-19 NOTE — DISCHARGE INSTRUCTIONS
Patient received in the milieu watching television and socializing with peers. Patient appears to be anxious, and depressed. Patient denies SI/HI/AVH or any pain at this time. Patient states that his appetite and sleep are okay. Patient does attend groups and is working on new coping skills. Patient was seen in the milieu in a happy mood laughing, and dancing with peers. Patient also participated in a push up challenge with other peers. Patient is compliant with medications and mouth checks. Safety precautions maintained.   What to expect when you have contrast    During your exam, we will inject  contrast  into your vein or artery. (Contrast is a clear liquid with iodine in it. It shows up on X-rays.)    You may feel warm or hot. You may have a metal taste in your mouth and a slight upset stomach. You may also feel pressure near the kidneys and bladder. These effects will last about 1 to 3 minutes.    Please tell us if you have:   Sneezing    Itching   Hives    Swelling in the face   A hoarse voice   Breathing problems   Other new symptoms    Serious problems are rare.  They may include:   Irregular heartbeat    Seizures   Kidney failure             Tissue damage   Shock     Death    If you have any problems during the exam, we  will treat them right away.    When you get home    Call your hospital if you have any new symptoms in the next 2 days, like hives or swelling. (Phone numbers are at the bottom of this page.) Or call your family doctor.     If you have wheezing or trouble breathing, call 911.    Self-care  -Drink at least 4 extra glasses of water today.   This reduces the stress on your kidneys.  -Keep taking your regular medicines.    The contrast will pass out of your body in your  Urine(pee). This will happen in the next 24 hours. You  will not feel this. Your urine will not  change color.    If you have kidney problems or take metformin    Drink 4 to 8 large glasses of water for the next  2 days, if you are not on a fluid restriction.    ?If you take metformin (Glucophage or Glucovance) for diabetes, keep taking it.      ?Your kidney function tests are abnormal.  If you take Metformin, do not take it for 48 hours. Please go to your clinic for a blood test within 3 days after your exam before the restarting this medicine.     (Note to provider:please give patient prescription for lab tests.)    ?Special instructions: ***    I have read and understand the above information.    Patient Sign  Here:______________________________________Date:________Time:______    Staff Sign Here:________________________________________Date:_______Time:______      Radiology Departments:     ?[facility]: [phone] ?[place]: [phone]     ?[place]: [phone] ?[place]:[phone]      ?[place]: [phone]  ?[place]: [phone]  ?[place]:[phone]    ?[facility]:[phone]  ?[facility]:[phone]

## 2024-07-19 NOTE — NURSING NOTE
Chief Complaint   Patient presents with    Blood Draw     IV placement with blood draw by lab RN       Labs drawn via IV placed by lab RN. Vitals taken and appointment arrived.    Saida Solo RN

## 2024-07-22 ENCOUNTER — PATIENT OUTREACH (OUTPATIENT)
Dept: ONCOLOGY | Facility: CLINIC | Age: 84
End: 2024-07-22
Payer: MEDICARE

## 2024-07-22 NOTE — PROGRESS NOTES
[facility]: Cancer Care                                                                                          Reached out to Benson per the request of Dr. Pena. Relayed that his liver MRI radiology report came back and there is no evidence of disease in his liver. Benson was appreciative of the call and had no further questions.    Lynn Mike, RN, BSN  RN Care Coordinator  [facility]

## 2024-12-03 ENCOUNTER — PATIENT OUTREACH (OUTPATIENT)
Dept: ONCOLOGY | Facility: CLINIC | Age: 84
End: 2024-12-03
Payer: MEDICARE

## 2024-12-03 NOTE — PROGRESS NOTES
[facility]/Voicemail    Clinical Data: Care Coordinator Outreach    Received VM from Benson inquiring about getting imaging completed locally in January.    Outreach attempted x 1.  Left message on patient's voicemail with call back information and requested return call.    Lynn Mike, RN, BSN  RN Care Coordinator  [facility]

## 2024-12-04 ENCOUNTER — PATIENT OUTREACH (OUTPATIENT)
Dept: ONCOLOGY | Facility: CLINIC | Age: 84
End: 2024-12-04
Payer: MEDICARE

## 2024-12-04 DIAGNOSIS — C69.32 CHOROIDAL MALIGNANT MELANOMA, LEFT (H): Primary | ICD-10-CM

## 2024-12-04 NOTE — PROGRESS NOTES
[facility]: Cancer Care                                                                                          Received call from Benson. It is too difficult for him to get to appointments in [place] from [place] (4 hour drive) so he is requesting we send an oncology referral to [facility] for him to establish with someone locally. Virtual appointments are not an option for him.    Face sheet, last office visit note, and medical oncology referral faxed to [facility] at fax #[phone].      Benson is aware to follow up with [place] to schedule.

## 2024-12-09 ENCOUNTER — PATIENT OUTREACH (OUTPATIENT)
Dept: ONCOLOGY | Facility: CLINIC | Age: 84
End: 2024-12-09
Payer: MEDICARE

## 2024-12-09 NOTE — PROGRESS NOTES
[facility]: Cancer Care                                                                                          Received call from Benson. He is transferring his care to [facility] and would like to cancel his appointments with us. It is too difficult for him to get care at the  since he lives so far away.    He would like to cancel all upcoming appointments in our system. Message sent to scheduling.    Discussed that we are available in the future if he would like to follow up with us. We are also available if his new oncology team would like to communicate with Dr. Pena. Benson took note of our phone number and was appreciative of the information.    .Lynn Mike, RN, BSN  RN Care Coordinator  [facility]

## (undated) DEVICE — CATH TRAY FOLEY 16FR BARDEX W/DRAIN BAG STATLOCK 300316A

## (undated) DEVICE — LINEN TOWEL PACK X5 5464

## (undated) DEVICE — EYE PREP BETADINE 5% SOLUTION 30ML 0065-0411-30

## (undated) DEVICE — SU SILK 0 SH 30" K834H

## (undated) DEVICE — EYE NDL RETROBULBAR 25GA 60-111637

## (undated) DEVICE — SYR 10ML FINGER CONTROL W/O NDL 309695

## (undated) DEVICE — DRAPE SHEET MED 44X70" 9355

## (undated) DEVICE — DRAPE MAYO STAND 23X54 8337

## (undated) DEVICE — LABEL MEDICATION SYSTEM 3303-P

## (undated) DEVICE — DRSG GAUZE 4X4" TRAY 6939

## (undated) DEVICE — PREP PAD ALCOHOL 6818

## (undated) DEVICE — GLOVE BIOGEL PI MICRO SZ 7.5 48575

## (undated) RX ORDER — IPRATROPIUM BROMIDE AND ALBUTEROL SULFATE 2.5; .5 MG/3ML; MG/3ML
SOLUTION RESPIRATORY (INHALATION)
Status: DISPENSED
Start: 2024-04-29

## (undated) RX ORDER — ONDANSETRON 2 MG/ML
INJECTION INTRAMUSCULAR; INTRAVENOUS
Status: DISPENSED
Start: 2024-04-29

## (undated) RX ORDER — LIDOCAINE HYDROCHLORIDE 40 MG/ML
INJECTION, SOLUTION RETROBULBAR
Status: DISPENSED
Start: 2024-04-29

## (undated) RX ORDER — ERYTHROMYCIN 5 MG/G
OINTMENT OPHTHALMIC
Status: DISPENSED
Start: 2024-04-29

## (undated) RX ORDER — DEXAMETHASONE SODIUM PHOSPHATE 4 MG/ML
INJECTION, SOLUTION INTRA-ARTICULAR; INTRALESIONAL; INTRAMUSCULAR; INTRAVENOUS; SOFT TISSUE
Status: DISPENSED
Start: 2024-04-29

## (undated) RX ORDER — FENTANYL CITRATE-0.9 % NACL/PF 10 MCG/ML
PLASTIC BAG, INJECTION (ML) INTRAVENOUS
Status: DISPENSED
Start: 2024-04-29

## (undated) RX ORDER — PROPOFOL 10 MG/ML
INJECTION, EMULSION INTRAVENOUS
Status: DISPENSED
Start: 2024-04-29

## (undated) RX ORDER — LIDOCAINE HYDROCHLORIDE 10 MG/ML
INJECTION, SOLUTION EPIDURAL; INFILTRATION; INTRACAUDAL; PERINEURAL
Status: DISPENSED
Start: 2024-04-29

## (undated) RX ORDER — FENTANYL CITRATE 50 UG/ML
INJECTION, SOLUTION INTRAMUSCULAR; INTRAVENOUS
Status: DISPENSED
Start: 2024-04-29

## (undated) RX ORDER — LABETALOL HYDROCHLORIDE 5 MG/ML
INJECTION, SOLUTION INTRAVENOUS
Status: DISPENSED
Start: 2024-04-29

## (undated) RX ORDER — TROPICAMIDE 10 MG/ML
SOLUTION/ DROPS OPHTHALMIC
Status: DISPENSED
Start: 2024-04-29

## (undated) RX ORDER — GLYCOPYRROLATE 0.2 MG/ML
INJECTION, SOLUTION INTRAMUSCULAR; INTRAVENOUS
Status: DISPENSED
Start: 2024-04-29

## (undated) RX ORDER — CYCLOPENTOLATE HYDROCHLORIDE 10 MG/ML
SOLUTION/ DROPS OPHTHALMIC
Status: DISPENSED
Start: 2024-04-29

## (undated) RX ORDER — PHENYLEPHRINE HYDROCHLORIDE 25 MG/ML
SOLUTION/ DROPS OPHTHALMIC
Status: DISPENSED
Start: 2024-04-29

## (undated) RX ORDER — BALANCED SALT SOLUTION 6.4; .75; .48; .3; 3.9; 1.7 MG/ML; MG/ML; MG/ML; MG/ML; MG/ML; MG/ML
SOLUTION OPHTHALMIC
Status: DISPENSED
Start: 2024-04-29